# Patient Record
Sex: MALE | Race: WHITE | NOT HISPANIC OR LATINO | Employment: FULL TIME | URBAN - NONMETROPOLITAN AREA
[De-identification: names, ages, dates, MRNs, and addresses within clinical notes are randomized per-mention and may not be internally consistent; named-entity substitution may affect disease eponyms.]

---

## 2020-06-27 ENCOUNTER — HOSPITAL ENCOUNTER (INPATIENT)
Facility: HOSPITAL | Age: 30
LOS: 6 days | Discharge: HOME | End: 2020-07-03
Attending: FAMILY MEDICINE | Admitting: SURGERY

## 2020-06-27 ENCOUNTER — APPOINTMENT (OUTPATIENT)
Dept: CT IMAGING | Facility: HOSPITAL | Age: 30
End: 2020-06-27

## 2020-06-27 DIAGNOSIS — M79.604 PAIN OF RIGHT LOWER EXTREMITY DUE TO ISCHEMIA: ICD-10-CM

## 2020-06-27 DIAGNOSIS — I99.8 PAIN OF RIGHT LOWER EXTREMITY DUE TO ISCHEMIA: ICD-10-CM

## 2020-06-27 DIAGNOSIS — I70.209 OCCLUSION OF ARTERY OF LOWER EXTREMITY (HCC): Primary | ICD-10-CM

## 2020-06-27 LAB
ALBUMIN SERPL-MCNC: 4.1 G/DL (ref 3.5–5.2)
ALBUMIN/GLOB SERPL: 1.5 G/DL
ALP SERPL-CCNC: 63 U/L (ref 39–117)
ALT SERPL W P-5'-P-CCNC: 70 U/L (ref 1–41)
ANION GAP SERPL CALCULATED.3IONS-SCNC: 12 MMOL/L (ref 5–15)
APTT PPP: 30.3 SECONDS (ref 24.1–35)
APTT PPP: 38.7 SECONDS (ref 24.1–35)
AST SERPL-CCNC: 48 U/L (ref 1–40)
BASOPHILS # BLD AUTO: 0.04 10*3/MM3 (ref 0–0.2)
BASOPHILS NFR BLD AUTO: 0.7 % (ref 0–1.5)
BILIRUB SERPL-MCNC: 0.7 MG/DL (ref 0.2–1.2)
BUN BLD-MCNC: 15 MG/DL (ref 6–20)
BUN/CREAT SERPL: 13.9 (ref 7–25)
CALCIUM SPEC-SCNC: 9.6 MG/DL (ref 8.6–10.5)
CHLORIDE SERPL-SCNC: 100 MMOL/L (ref 98–107)
CO2 SERPL-SCNC: 27 MMOL/L (ref 22–29)
CREAT BLD-MCNC: 1.08 MG/DL (ref 0.76–1.27)
DEPRECATED RDW RBC AUTO: 41.4 FL (ref 37–54)
EOSINOPHIL # BLD AUTO: 0.11 10*3/MM3 (ref 0–0.4)
EOSINOPHIL NFR BLD AUTO: 1.8 % (ref 0.3–6.2)
ERYTHROCYTE [DISTWIDTH] IN BLOOD BY AUTOMATED COUNT: 12.9 % (ref 12.3–15.4)
GFR SERPL CREATININE-BSD FRML MDRD: 79 ML/MIN/1.73
GLOBULIN UR ELPH-MCNC: 2.8 GM/DL
GLUCOSE BLD-MCNC: 86 MG/DL (ref 65–99)
HCT VFR BLD AUTO: 41.4 % (ref 37.5–51)
HGB BLD-MCNC: 14.9 G/DL (ref 13–17.7)
IMM GRANULOCYTES # BLD AUTO: 0.02 10*3/MM3 (ref 0–0.05)
IMM GRANULOCYTES NFR BLD AUTO: 0.3 % (ref 0–0.5)
INR PPP: 1.06 (ref 0.91–1.09)
LYMPHOCYTES # BLD AUTO: 0.7 10*3/MM3 (ref 0.7–3.1)
LYMPHOCYTES NFR BLD AUTO: 11.6 % (ref 19.6–45.3)
MCH RBC QN AUTO: 31.6 PG (ref 26.6–33)
MCHC RBC AUTO-ENTMCNC: 36 G/DL (ref 31.5–35.7)
MCV RBC AUTO: 87.7 FL (ref 79–97)
MONOCYTES # BLD AUTO: 0.6 10*3/MM3 (ref 0.1–0.9)
MONOCYTES NFR BLD AUTO: 9.9 % (ref 5–12)
NEUTROPHILS # BLD AUTO: 4.57 10*3/MM3 (ref 1.7–7)
NEUTROPHILS NFR BLD AUTO: 75.7 % (ref 42.7–76)
NRBC BLD AUTO-RTO: 0 /100 WBC (ref 0–0.2)
PLATELET # BLD AUTO: 250 10*3/MM3 (ref 140–450)
PMV BLD AUTO: 9.3 FL (ref 6–12)
POTASSIUM BLD-SCNC: 4.5 MMOL/L (ref 3.5–5.2)
PROT SERPL-MCNC: 6.9 G/DL (ref 6–8.5)
PROTHROMBIN TIME: 13.4 SECONDS (ref 11.9–14.6)
RBC # BLD AUTO: 4.72 10*6/MM3 (ref 4.14–5.8)
SARS-COV-2 RNA RESP QL NAA+PROBE: NOT DETECTED
SODIUM BLD-SCNC: 139 MMOL/L (ref 136–145)
WBC NRBC COR # BLD: 6.04 10*3/MM3 (ref 3.4–10.8)

## 2020-06-27 PROCEDURE — 0 IOPAMIDOL PER 1 ML: Performed by: FAMILY MEDICINE

## 2020-06-27 PROCEDURE — 99285 EMERGENCY DEPT VISIT HI MDM: CPT

## 2020-06-27 PROCEDURE — 85025 COMPLETE CBC W/AUTO DIFF WBC: CPT | Performed by: FAMILY MEDICINE

## 2020-06-27 PROCEDURE — 80053 COMPREHEN METABOLIC PANEL: CPT | Performed by: FAMILY MEDICINE

## 2020-06-27 PROCEDURE — 85610 PROTHROMBIN TIME: CPT | Performed by: FAMILY MEDICINE

## 2020-06-27 PROCEDURE — 87635 SARS-COV-2 COVID-19 AMP PRB: CPT | Performed by: FAMILY MEDICINE

## 2020-06-27 PROCEDURE — 99284 EMERGENCY DEPT VISIT MOD MDM: CPT

## 2020-06-27 PROCEDURE — 25010000002 HYDROMORPHONE PER 4 MG: Performed by: SURGERY

## 2020-06-27 PROCEDURE — 73706 CT ANGIO LWR EXTR W/O&W/DYE: CPT

## 2020-06-27 PROCEDURE — 25010000002 HEPARIN (PORCINE) PER 1000 UNITS: Performed by: FAMILY MEDICINE

## 2020-06-27 PROCEDURE — 99223 1ST HOSP IP/OBS HIGH 75: CPT | Performed by: SURGERY

## 2020-06-27 PROCEDURE — 25010000002 MORPHINE PER 10 MG: Performed by: FAMILY MEDICINE

## 2020-06-27 PROCEDURE — 85730 THROMBOPLASTIN TIME PARTIAL: CPT | Performed by: SURGERY

## 2020-06-27 PROCEDURE — 25010000002 HEPARIN (PORCINE) PER 1000 UNITS: Performed by: SURGERY

## 2020-06-27 PROCEDURE — 25010000002 ONDANSETRON PER 1 MG: Performed by: FAMILY MEDICINE

## 2020-06-27 PROCEDURE — 85730 THROMBOPLASTIN TIME PARTIAL: CPT | Performed by: FAMILY MEDICINE

## 2020-06-27 PROCEDURE — C9803 HOPD COVID-19 SPEC COLLECT: HCPCS | Performed by: FAMILY MEDICINE

## 2020-06-27 RX ORDER — ONDANSETRON 2 MG/ML
4 INJECTION INTRAMUSCULAR; INTRAVENOUS ONCE
Status: COMPLETED | OUTPATIENT
Start: 2020-06-27 | End: 2020-06-27

## 2020-06-27 RX ORDER — HEPARIN SODIUM 5000 [USP'U]/ML
5000 INJECTION, SOLUTION INTRAVENOUS; SUBCUTANEOUS ONCE
Status: COMPLETED | OUTPATIENT
Start: 2020-06-27 | End: 2020-06-27

## 2020-06-27 RX ORDER — SODIUM CHLORIDE 0.9 % (FLUSH) 0.9 %
10 SYRINGE (ML) INJECTION EVERY 12 HOURS SCHEDULED
Status: DISCONTINUED | OUTPATIENT
Start: 2020-06-27 | End: 2020-07-03 | Stop reason: HOSPADM

## 2020-06-27 RX ORDER — NALOXONE HCL 0.4 MG/ML
0.4 VIAL (ML) INJECTION
Status: DISCONTINUED | OUTPATIENT
Start: 2020-06-27 | End: 2020-07-03 | Stop reason: HOSPADM

## 2020-06-27 RX ORDER — HEPARIN SODIUM 1000 [USP'U]/ML
10000 INJECTION, SOLUTION INTRAVENOUS; SUBCUTANEOUS AS NEEDED
Status: DISCONTINUED | OUTPATIENT
Start: 2020-06-27 | End: 2020-07-01

## 2020-06-27 RX ORDER — SODIUM CHLORIDE 0.9 % (FLUSH) 0.9 %
10 SYRINGE (ML) INJECTION AS NEEDED
Status: DISCONTINUED | OUTPATIENT
Start: 2020-06-27 | End: 2020-07-03 | Stop reason: HOSPADM

## 2020-06-27 RX ORDER — ONDANSETRON 2 MG/ML
4 INJECTION INTRAMUSCULAR; INTRAVENOUS EVERY 6 HOURS PRN
Status: DISCONTINUED | OUTPATIENT
Start: 2020-06-27 | End: 2020-07-03 | Stop reason: HOSPADM

## 2020-06-27 RX ORDER — ONDANSETRON 4 MG/1
4 TABLET, FILM COATED ORAL EVERY 6 HOURS PRN
Status: DISCONTINUED | OUTPATIENT
Start: 2020-06-27 | End: 2020-07-03 | Stop reason: HOSPADM

## 2020-06-27 RX ORDER — HYDROMORPHONE HYDROCHLORIDE 1 MG/ML
0.5 INJECTION, SOLUTION INTRAMUSCULAR; INTRAVENOUS; SUBCUTANEOUS
Status: DISCONTINUED | OUTPATIENT
Start: 2020-06-27 | End: 2020-07-03 | Stop reason: HOSPADM

## 2020-06-27 RX ORDER — SODIUM CHLORIDE 9 MG/ML
125 INJECTION, SOLUTION INTRAVENOUS CONTINUOUS
Status: DISCONTINUED | OUTPATIENT
Start: 2020-06-27 | End: 2020-07-01

## 2020-06-27 RX ORDER — HEPARIN SODIUM 10000 [USP'U]/100ML
1500 INJECTION, SOLUTION INTRAVENOUS
Status: DISCONTINUED | OUTPATIENT
Start: 2020-06-27 | End: 2020-07-01

## 2020-06-27 RX ORDER — HEPARIN SODIUM 1000 [USP'U]/ML
5000 INJECTION, SOLUTION INTRAVENOUS; SUBCUTANEOUS AS NEEDED
Status: DISCONTINUED | OUTPATIENT
Start: 2020-06-27 | End: 2020-07-01

## 2020-06-27 RX ORDER — HYDROCODONE BITARTRATE AND ACETAMINOPHEN 10; 325 MG/1; MG/1
1 TABLET ORAL EVERY 4 HOURS PRN
Status: DISCONTINUED | OUTPATIENT
Start: 2020-06-27 | End: 2020-07-02

## 2020-06-27 RX ADMIN — MORPHINE SULFATE 4 MG: 4 INJECTION, SOLUTION INTRAMUSCULAR; INTRAVENOUS at 16:22

## 2020-06-27 RX ADMIN — HYDROMORPHONE HYDROCHLORIDE 0.5 MG: 1 INJECTION, SOLUTION INTRAMUSCULAR; INTRAVENOUS; SUBCUTANEOUS at 21:38

## 2020-06-27 RX ADMIN — MORPHINE SULFATE 4 MG: 4 INJECTION, SOLUTION INTRAMUSCULAR; INTRAVENOUS at 13:27

## 2020-06-27 RX ADMIN — HYDROCODONE BITARTRATE AND ACETAMINOPHEN 1 TABLET: 10; 325 TABLET ORAL at 23:53

## 2020-06-27 RX ADMIN — HEPARIN SODIUM 1700 UNITS/HR: 10000 INJECTION, SOLUTION INTRAVENOUS at 22:15

## 2020-06-27 RX ADMIN — HYDROCODONE BITARTRATE AND ACETAMINOPHEN 1 TABLET: 10; 325 TABLET ORAL at 19:48

## 2020-06-27 RX ADMIN — HYDROMORPHONE HYDROCHLORIDE 0.5 MG: 1 INJECTION, SOLUTION INTRAMUSCULAR; INTRAVENOUS; SUBCUTANEOUS at 18:46

## 2020-06-27 RX ADMIN — HEPARIN SODIUM 10000 UNITS: 1000 INJECTION, SOLUTION INTRAVENOUS; SUBCUTANEOUS at 23:37

## 2020-06-27 RX ADMIN — HEPARIN SODIUM 5000 UNITS: 5000 INJECTION, SOLUTION INTRAVENOUS; SUBCUTANEOUS at 18:04

## 2020-06-27 RX ADMIN — IOPAMIDOL 100 ML: 755 INJECTION, SOLUTION INTRAVENOUS at 15:13

## 2020-06-27 RX ADMIN — SODIUM CHLORIDE 125 ML/HR: 9 INJECTION, SOLUTION INTRAVENOUS at 13:31

## 2020-06-27 RX ADMIN — ONDANSETRON HYDROCHLORIDE 4 MG: 2 SOLUTION INTRAMUSCULAR; INTRAVENOUS at 13:27

## 2020-06-27 RX ADMIN — HEPARIN SODIUM 1500 UNITS/HR: 10000 INJECTION, SOLUTION INTRAVENOUS at 19:40

## 2020-06-28 ENCOUNTER — APPOINTMENT (OUTPATIENT)
Dept: CT IMAGING | Facility: HOSPITAL | Age: 30
End: 2020-06-28

## 2020-06-28 ENCOUNTER — APPOINTMENT (OUTPATIENT)
Dept: CARDIOLOGY | Facility: HOSPITAL | Age: 30
End: 2020-06-28

## 2020-06-28 LAB
ABO GROUP BLD: NORMAL
ANION GAP SERPL CALCULATED.3IONS-SCNC: 11 MMOL/L (ref 5–15)
APTT PPP: 152.2 SECONDS (ref 24.1–35)
APTT PPP: 54.3 SECONDS (ref 24.1–35)
APTT PPP: 80.8 SECONDS (ref 24.1–35)
BASOPHILS # BLD AUTO: 0.04 10*3/MM3 (ref 0–0.2)
BASOPHILS NFR BLD AUTO: 0.8 % (ref 0–1.5)
BH CV ECHO MEAS - AO MAX PG (FULL): 3.5 MMHG
BH CV ECHO MEAS - AO MAX PG: 5.6 MMHG
BH CV ECHO MEAS - AO MEAN PG (FULL): 3 MMHG
BH CV ECHO MEAS - AO MEAN PG: 4 MMHG
BH CV ECHO MEAS - AO ROOT AREA (BSA CORRECTED): 1.5
BH CV ECHO MEAS - AO ROOT AREA: 10.8 CM^2
BH CV ECHO MEAS - AO ROOT DIAM: 3.7 CM
BH CV ECHO MEAS - AO V2 MAX: 118 CM/SEC
BH CV ECHO MEAS - AO V2 MEAN: 93.2 CM/SEC
BH CV ECHO MEAS - AO V2 VTI: 26.4 CM
BH CV ECHO MEAS - AVA(I,A): 2.5 CM^2
BH CV ECHO MEAS - AVA(I,D): 2.5 CM^2
BH CV ECHO MEAS - AVA(V,A): 2.3 CM^2
BH CV ECHO MEAS - AVA(V,D): 2.3 CM^2
BH CV ECHO MEAS - BSA(HAYCOCK): 2.7 M^2
BH CV ECHO MEAS - BSA: 2.5 M^2
BH CV ECHO MEAS - BZI_BMI: 38.8 KILOGRAMS/M^2
BH CV ECHO MEAS - BZI_METRIC_HEIGHT: 185.4 CM
BH CV ECHO MEAS - BZI_METRIC_WEIGHT: 133.4 KG
BH CV ECHO MEAS - EDV(CUBED): 125.8 ML
BH CV ECHO MEAS - EDV(MOD-SP4): 222 ML
BH CV ECHO MEAS - EDV(TEICH): 118.8 ML
BH CV ECHO MEAS - EF(CUBED): 64.7 %
BH CV ECHO MEAS - EF(MOD-SP4): 60.9 %
BH CV ECHO MEAS - EF(TEICH): 56 %
BH CV ECHO MEAS - ESV(CUBED): 44.4 ML
BH CV ECHO MEAS - ESV(MOD-SP4): 86.7 ML
BH CV ECHO MEAS - ESV(TEICH): 52.3 ML
BH CV ECHO MEAS - FS: 29.3 %
BH CV ECHO MEAS - IVS/LVPW: 0.85
BH CV ECHO MEAS - IVSD: 1.2 CM
BH CV ECHO MEAS - LA DIMENSION: 5.1 CM
BH CV ECHO MEAS - LA/AO: 1.4
BH CV ECHO MEAS - LAT PEAK E' VEL: 8.1 CM/SEC
BH CV ECHO MEAS - LV DIASTOLIC VOL/BSA (35-75): 87.6 ML/M^2
BH CV ECHO MEAS - LV MASS(C)D: 255.5 GRAMS
BH CV ECHO MEAS - LV MASS(C)DI: 100.8 GRAMS/M^2
BH CV ECHO MEAS - LV MAX PG: 2.1 MMHG
BH CV ECHO MEAS - LV MEAN PG: 1 MMHG
BH CV ECHO MEAS - LV SYSTOLIC VOL/BSA (12-30): 34.2 ML/M^2
BH CV ECHO MEAS - LV V1 MAX: 72.5 CM/SEC
BH CV ECHO MEAS - LV V1 MEAN: 56.9 CM/SEC
BH CV ECHO MEAS - LV V1 VTI: 17.4 CM
BH CV ECHO MEAS - LVIDD: 5 CM
BH CV ECHO MEAS - LVIDS: 3.5 CM
BH CV ECHO MEAS - LVLD AP4: 10.1 CM
BH CV ECHO MEAS - LVLS AP4: 7.8 CM
BH CV ECHO MEAS - LVOT AREA (M): 3.8 CM^2
BH CV ECHO MEAS - LVOT AREA: 3.8 CM^2
BH CV ECHO MEAS - LVOT DIAM: 2.2 CM
BH CV ECHO MEAS - LVPWD: 1.4 CM
BH CV ECHO MEAS - MED PEAK E' VEL: 6.4 CM/SEC
BH CV ECHO MEAS - MR MAX PG: 101.6 MMHG
BH CV ECHO MEAS - MR MAX VEL: 504 CM/SEC
BH CV ECHO MEAS - MR MEAN PG: 72 MMHG
BH CV ECHO MEAS - MR MEAN VEL: 404 CM/SEC
BH CV ECHO MEAS - MR VTI: 179 CM
BH CV ECHO MEAS - MV A MAX VEL: 43.7 CM/SEC
BH CV ECHO MEAS - MV DEC SLOPE: 158 CM/SEC^2
BH CV ECHO MEAS - MV DEC TIME: 0.35 SEC
BH CV ECHO MEAS - MV E MAX VEL: 55.6 CM/SEC
BH CV ECHO MEAS - MV E/A: 1.3
BH CV ECHO MEAS - RAP SYSTOLE: 10 MMHG
BH CV ECHO MEAS - RVSP: 35.2 MMHG
BH CV ECHO MEAS - SI(AO): 112 ML/M^2
BH CV ECHO MEAS - SI(CUBED): 32.1 ML/M^2
BH CV ECHO MEAS - SI(LVOT): 26.1 ML/M^2
BH CV ECHO MEAS - SI(MOD-SP4): 53.4 ML/M^2
BH CV ECHO MEAS - SI(TEICH): 26.2 ML/M^2
BH CV ECHO MEAS - SV(AO): 283.9 ML
BH CV ECHO MEAS - SV(CUBED): 81.4 ML
BH CV ECHO MEAS - SV(LVOT): 66.1 ML
BH CV ECHO MEAS - SV(MOD-SP4): 135.3 ML
BH CV ECHO MEAS - SV(TEICH): 66.5 ML
BH CV ECHO MEAS - TR MAX VEL: 251 CM/SEC
BH CV ECHO MEASUREMENTS AVERAGE E/E' RATIO: 7.67
BLD GP AB SCN SERPL QL: NEGATIVE
BUN BLD-MCNC: 12 MG/DL (ref 6–20)
BUN/CREAT SERPL: 12.9 (ref 7–25)
CALCIUM SPEC-SCNC: 9 MG/DL (ref 8.6–10.5)
CHLORIDE SERPL-SCNC: 103 MMOL/L (ref 98–107)
CO2 SERPL-SCNC: 25 MMOL/L (ref 22–29)
CREAT BLD-MCNC: 0.93 MG/DL (ref 0.76–1.27)
DEPRECATED RDW RBC AUTO: 40.4 FL (ref 37–54)
EOSINOPHIL # BLD AUTO: 0.11 10*3/MM3 (ref 0–0.4)
EOSINOPHIL NFR BLD AUTO: 2.2 % (ref 0.3–6.2)
ERYTHROCYTE [DISTWIDTH] IN BLOOD BY AUTOMATED COUNT: 12.8 % (ref 12.3–15.4)
GFR SERPL CREATININE-BSD FRML MDRD: 94 ML/MIN/1.73
GLUCOSE BLD-MCNC: 104 MG/DL (ref 65–99)
HCT VFR BLD AUTO: 41 % (ref 37.5–51)
HGB BLD-MCNC: 14.9 G/DL (ref 13–17.7)
IMM GRANULOCYTES # BLD AUTO: 0.02 10*3/MM3 (ref 0–0.05)
IMM GRANULOCYTES NFR BLD AUTO: 0.4 % (ref 0–0.5)
LEFT ATRIUM VOLUME INDEX: 22 ML/M2
LEFT ATRIUM VOLUME: 55.6 CM3
LYMPHOCYTES # BLD AUTO: 0.75 10*3/MM3 (ref 0.7–3.1)
LYMPHOCYTES NFR BLD AUTO: 14.8 % (ref 19.6–45.3)
MAXIMAL PREDICTED HEART RATE: 188 BPM
MCH RBC QN AUTO: 31.8 PG (ref 26.6–33)
MCHC RBC AUTO-ENTMCNC: 36.3 G/DL (ref 31.5–35.7)
MCV RBC AUTO: 87.4 FL (ref 79–97)
MONOCYTES # BLD AUTO: 0.54 10*3/MM3 (ref 0.1–0.9)
MONOCYTES NFR BLD AUTO: 10.7 % (ref 5–12)
NEUTROPHILS # BLD AUTO: 3.6 10*3/MM3 (ref 1.7–7)
NEUTROPHILS NFR BLD AUTO: 71.1 % (ref 42.7–76)
NRBC BLD AUTO-RTO: 0 /100 WBC (ref 0–0.2)
PLATELET # BLD AUTO: 233 10*3/MM3 (ref 140–450)
PMV BLD AUTO: 9.5 FL (ref 6–12)
POTASSIUM BLD-SCNC: 4.1 MMOL/L (ref 3.5–5.2)
RBC # BLD AUTO: 4.69 10*6/MM3 (ref 4.14–5.8)
RH BLD: POSITIVE
SODIUM BLD-SCNC: 139 MMOL/L (ref 136–145)
STRESS TARGET HR: 160 BPM
T&S EXPIRATION DATE: NORMAL
WBC NRBC COR # BLD: 5.06 10*3/MM3 (ref 3.4–10.8)

## 2020-06-28 PROCEDURE — 86850 RBC ANTIBODY SCREEN: CPT | Performed by: SURGERY

## 2020-06-28 PROCEDURE — 36415 COLL VENOUS BLD VENIPUNCTURE: CPT | Performed by: SURGERY

## 2020-06-28 PROCEDURE — 85025 COMPLETE CBC W/AUTO DIFF WBC: CPT | Performed by: SURGERY

## 2020-06-28 PROCEDURE — 86901 BLOOD TYPING SEROLOGIC RH(D): CPT | Performed by: SURGERY

## 2020-06-28 PROCEDURE — 99024 POSTOP FOLLOW-UP VISIT: CPT | Performed by: SURGERY

## 2020-06-28 PROCEDURE — 80048 BASIC METABOLIC PNL TOTAL CA: CPT | Performed by: SURGERY

## 2020-06-28 PROCEDURE — 93306 TTE W/DOPPLER COMPLETE: CPT

## 2020-06-28 PROCEDURE — 25010000002 HEPARIN (PORCINE) PER 1000 UNITS: Performed by: SURGERY

## 2020-06-28 PROCEDURE — 71275 CT ANGIOGRAPHY CHEST: CPT

## 2020-06-28 PROCEDURE — 86900 BLOOD TYPING SEROLOGIC ABO: CPT | Performed by: SURGERY

## 2020-06-28 PROCEDURE — 93306 TTE W/DOPPLER COMPLETE: CPT | Performed by: INTERNAL MEDICINE

## 2020-06-28 PROCEDURE — 85730 THROMBOPLASTIN TIME PARTIAL: CPT | Performed by: SURGERY

## 2020-06-28 PROCEDURE — 25010000002 HYDROMORPHONE PER 4 MG: Performed by: SURGERY

## 2020-06-28 PROCEDURE — 0 IOPAMIDOL PER 1 ML: Performed by: SURGERY

## 2020-06-28 PROCEDURE — 25010000002 PERFLUTREN 6.52 MG/ML SUSPENSION: Performed by: SURGERY

## 2020-06-28 RX ADMIN — PERFLUTREN 8.48 MG: 6.52 INJECTION, SUSPENSION INTRAVENOUS at 14:05

## 2020-06-28 RX ADMIN — SODIUM CHLORIDE 125 ML/HR: 9 INJECTION, SOLUTION INTRAVENOUS at 19:58

## 2020-06-28 RX ADMIN — HYDROCODONE BITARTRATE AND ACETAMINOPHEN 1 TABLET: 10; 325 TABLET ORAL at 09:34

## 2020-06-28 RX ADMIN — SODIUM CHLORIDE 125 ML/HR: 9 INJECTION, SOLUTION INTRAVENOUS at 13:19

## 2020-06-28 RX ADMIN — HYDROCODONE BITARTRATE AND ACETAMINOPHEN 1 TABLET: 10; 325 TABLET ORAL at 23:12

## 2020-06-28 RX ADMIN — HYDROMORPHONE HYDROCHLORIDE 0.5 MG: 1 INJECTION, SOLUTION INTRAMUSCULAR; INTRAVENOUS; SUBCUTANEOUS at 10:28

## 2020-06-28 RX ADMIN — HYDROMORPHONE HYDROCHLORIDE 0.5 MG: 1 INJECTION, SOLUTION INTRAMUSCULAR; INTRAVENOUS; SUBCUTANEOUS at 15:40

## 2020-06-28 RX ADMIN — HYDROMORPHONE HYDROCHLORIDE 0.5 MG: 1 INJECTION, SOLUTION INTRAMUSCULAR; INTRAVENOUS; SUBCUTANEOUS at 04:07

## 2020-06-28 RX ADMIN — HEPARIN SODIUM 10000 UNITS: 1000 INJECTION, SOLUTION INTRAVENOUS; SUBCUTANEOUS at 14:42

## 2020-06-28 RX ADMIN — IOPAMIDOL 84 ML: 755 INJECTION, SOLUTION INTRAVENOUS at 08:25

## 2020-06-28 RX ADMIN — HYDROCODONE BITARTRATE AND ACETAMINOPHEN 1 TABLET: 10; 325 TABLET ORAL at 14:41

## 2020-06-28 RX ADMIN — HYDROMORPHONE HYDROCHLORIDE 0.5 MG: 1 INJECTION, SOLUTION INTRAMUSCULAR; INTRAVENOUS; SUBCUTANEOUS at 22:21

## 2020-06-28 RX ADMIN — HYDROMORPHONE HYDROCHLORIDE 0.5 MG: 1 INJECTION, SOLUTION INTRAMUSCULAR; INTRAVENOUS; SUBCUTANEOUS at 07:43

## 2020-06-28 RX ADMIN — HYDROCODONE BITARTRATE AND ACETAMINOPHEN 1 TABLET: 10; 325 TABLET ORAL at 05:06

## 2020-06-28 RX ADMIN — HEPARIN SODIUM 1700 UNITS/HR: 10000 INJECTION, SOLUTION INTRAVENOUS at 06:30

## 2020-06-28 RX ADMIN — HYDROMORPHONE HYDROCHLORIDE 0.5 MG: 1 INJECTION, SOLUTION INTRAMUSCULAR; INTRAVENOUS; SUBCUTANEOUS at 12:57

## 2020-06-28 RX ADMIN — HEPARIN SODIUM 1700 UNITS/HR: 10000 INJECTION, SOLUTION INTRAVENOUS at 10:55

## 2020-06-28 RX ADMIN — HYDROMORPHONE HYDROCHLORIDE 0.5 MG: 1 INJECTION, SOLUTION INTRAMUSCULAR; INTRAVENOUS; SUBCUTANEOUS at 19:54

## 2020-06-28 RX ADMIN — HYDROMORPHONE HYDROCHLORIDE 0.5 MG: 1 INJECTION, SOLUTION INTRAMUSCULAR; INTRAVENOUS; SUBCUTANEOUS at 17:43

## 2020-06-28 RX ADMIN — HYDROMORPHONE HYDROCHLORIDE 0.5 MG: 1 INJECTION, SOLUTION INTRAMUSCULAR; INTRAVENOUS; SUBCUTANEOUS at 01:29

## 2020-06-28 RX ADMIN — HYDROCODONE BITARTRATE AND ACETAMINOPHEN 1 TABLET: 10; 325 TABLET ORAL at 18:43

## 2020-06-29 ENCOUNTER — ANESTHESIA EVENT (OUTPATIENT)
Dept: PERIOP | Facility: HOSPITAL | Age: 30
End: 2020-06-29

## 2020-06-29 ENCOUNTER — ANESTHESIA (OUTPATIENT)
Dept: PERIOP | Facility: HOSPITAL | Age: 30
End: 2020-06-29

## 2020-06-29 ENCOUNTER — APPOINTMENT (OUTPATIENT)
Dept: INTERVENTIONAL RADIOLOGY/VASCULAR | Facility: HOSPITAL | Age: 30
End: 2020-06-29

## 2020-06-29 LAB
ANION GAP SERPL CALCULATED.3IONS-SCNC: 11 MMOL/L (ref 5–15)
ANION GAP SERPL CALCULATED.3IONS-SCNC: 12 MMOL/L (ref 5–15)
APTT PPP: 131.7 SECONDS (ref 24.1–35)
APTT PPP: 42.8 SECONDS (ref 24.1–35)
APTT PPP: 56.4 SECONDS (ref 24.1–35)
APTT PPP: 59.2 SECONDS (ref 24.1–35)
APTT PPP: 63.6 SECONDS (ref 24.1–35)
BASOPHILS # BLD AUTO: 0.02 10*3/MM3 (ref 0–0.2)
BASOPHILS # BLD AUTO: 0.03 10*3/MM3 (ref 0–0.2)
BASOPHILS NFR BLD AUTO: 0.4 % (ref 0–1.5)
BASOPHILS NFR BLD AUTO: 0.6 % (ref 0–1.5)
BUN BLD-MCNC: 11 MG/DL (ref 6–20)
BUN SERPL-MCNC: 10 MG/DL (ref 6–20)
BUN/CREAT SERPL: 11.8 (ref 7–25)
BUN/CREAT SERPL: 8.8 (ref 7–25)
CALCIUM SPEC-SCNC: 8.9 MG/DL (ref 8.6–10.5)
CALCIUM SPEC-SCNC: 9 MG/DL (ref 8.6–10.5)
CHLORIDE SERPL-SCNC: 103 MMOL/L (ref 98–107)
CHLORIDE SERPL-SCNC: 104 MMOL/L (ref 98–107)
CO2 SERPL-SCNC: 24 MMOL/L (ref 22–29)
CO2 SERPL-SCNC: 25 MMOL/L (ref 22–29)
CREAT BLD-MCNC: 0.93 MG/DL (ref 0.76–1.27)
CREAT SERPL-MCNC: 1.13 MG/DL (ref 0.76–1.27)
DEPRECATED RDW RBC AUTO: 40.6 FL (ref 37–54)
DEPRECATED RDW RBC AUTO: 41.1 FL (ref 37–54)
EOSINOPHIL # BLD AUTO: 0.05 10*3/MM3 (ref 0–0.4)
EOSINOPHIL # BLD AUTO: 0.1 10*3/MM3 (ref 0–0.4)
EOSINOPHIL NFR BLD AUTO: 1 % (ref 0.3–6.2)
EOSINOPHIL NFR BLD AUTO: 2 % (ref 0.3–6.2)
ERYTHROCYTE [DISTWIDTH] IN BLOOD BY AUTOMATED COUNT: 12.8 % (ref 12.3–15.4)
ERYTHROCYTE [DISTWIDTH] IN BLOOD BY AUTOMATED COUNT: 12.8 % (ref 12.3–15.4)
GFR SERPL CREATININE-BSD FRML MDRD: 75 ML/MIN/1.73
GFR SERPL CREATININE-BSD FRML MDRD: 94 ML/MIN/1.73
GLUCOSE BLD-MCNC: 96 MG/DL (ref 65–99)
GLUCOSE SERPL-MCNC: 110 MG/DL (ref 65–99)
HCT VFR BLD AUTO: 39.1 % (ref 37.5–51)
HCT VFR BLD AUTO: 39.5 % (ref 37.5–51)
HGB BLD-MCNC: 13.8 G/DL (ref 13–17.7)
HGB BLD-MCNC: 14.1 G/DL (ref 13–17.7)
IMM GRANULOCYTES # BLD AUTO: 0.02 10*3/MM3 (ref 0–0.05)
IMM GRANULOCYTES # BLD AUTO: 0.03 10*3/MM3 (ref 0–0.05)
IMM GRANULOCYTES NFR BLD AUTO: 0.4 % (ref 0–0.5)
IMM GRANULOCYTES NFR BLD AUTO: 0.6 % (ref 0–0.5)
LYMPHOCYTES # BLD AUTO: 0.55 10*3/MM3 (ref 0.7–3.1)
LYMPHOCYTES # BLD AUTO: 0.76 10*3/MM3 (ref 0.7–3.1)
LYMPHOCYTES NFR BLD AUTO: 11.4 % (ref 19.6–45.3)
LYMPHOCYTES NFR BLD AUTO: 14.9 % (ref 19.6–45.3)
MCH RBC QN AUTO: 30.8 PG (ref 26.6–33)
MCH RBC QN AUTO: 31.3 PG (ref 26.6–33)
MCHC RBC AUTO-ENTMCNC: 35.3 G/DL (ref 31.5–35.7)
MCHC RBC AUTO-ENTMCNC: 35.7 G/DL (ref 31.5–35.7)
MCV RBC AUTO: 87.3 FL (ref 79–97)
MCV RBC AUTO: 87.8 FL (ref 79–97)
MONOCYTES # BLD AUTO: 0.36 10*3/MM3 (ref 0.1–0.9)
MONOCYTES # BLD AUTO: 0.51 10*3/MM3 (ref 0.1–0.9)
MONOCYTES NFR BLD AUTO: 10 % (ref 5–12)
MONOCYTES NFR BLD AUTO: 7.5 % (ref 5–12)
NEUTROPHILS # BLD AUTO: 3.69 10*3/MM3 (ref 1.7–7)
NEUTROPHILS NFR BLD AUTO: 3.81 10*3/MM3 (ref 1.7–7)
NEUTROPHILS NFR BLD AUTO: 72.3 % (ref 42.7–76)
NEUTROPHILS NFR BLD AUTO: 78.9 % (ref 42.7–76)
NRBC BLD AUTO-RTO: 0 /100 WBC (ref 0–0.2)
NRBC BLD AUTO-RTO: 0 /100 WBC (ref 0–0.2)
PLATELET # BLD AUTO: 229 10*3/MM3 (ref 140–450)
PLATELET # BLD AUTO: 242 10*3/MM3 (ref 140–450)
PMV BLD AUTO: 9.2 FL (ref 6–12)
PMV BLD AUTO: 9.3 FL (ref 6–12)
POTASSIUM BLD-SCNC: 4.2 MMOL/L (ref 3.5–5.2)
POTASSIUM SERPL-SCNC: 4.9 MMOL/L (ref 3.5–5.2)
RBC # BLD AUTO: 4.48 10*6/MM3 (ref 4.14–5.8)
RBC # BLD AUTO: 4.5 10*6/MM3 (ref 4.14–5.8)
SODIUM BLD-SCNC: 140 MMOL/L (ref 136–145)
SODIUM SERPL-SCNC: 139 MMOL/L (ref 136–145)
WBC # BLD AUTO: 4.83 10*3/MM3 (ref 3.4–10.8)
WBC NRBC COR # BLD: 5.1 10*3/MM3 (ref 3.4–10.8)

## 2020-06-29 PROCEDURE — C1874 STENT, COATED/COV W/DEL SYS: HCPCS | Performed by: SURGERY

## 2020-06-29 PROCEDURE — B41D1ZZ FLUOROSCOPY OF AORTA AND BILATERAL LOWER EXTREMITY ARTERIES USING LOW OSMOLAR CONTRAST: ICD-10-PCS | Performed by: SURGERY

## 2020-06-29 PROCEDURE — C1760 CLOSURE DEV, VASC: HCPCS | Performed by: SURGERY

## 2020-06-29 PROCEDURE — 25010000003 CEFAZOLIN PER 500 MG: Performed by: SURGERY

## 2020-06-29 PROCEDURE — 36415 COLL VENOUS BLD VENIPUNCTURE: CPT | Performed by: SURGERY

## 2020-06-29 PROCEDURE — C1725 CATH, TRANSLUMIN NON-LASER: HCPCS | Performed by: SURGERY

## 2020-06-29 PROCEDURE — 85025 COMPLETE CBC W/AUTO DIFF WBC: CPT | Performed by: SURGERY

## 2020-06-29 PROCEDURE — C1769 GUIDE WIRE: HCPCS | Performed by: SURGERY

## 2020-06-29 PROCEDURE — 047C3DZ DILATION OF RIGHT COMMON ILIAC ARTERY WITH INTRALUMINAL DEVICE, PERCUTANEOUS APPROACH: ICD-10-PCS | Performed by: SURGERY

## 2020-06-29 PROCEDURE — C1887 CATHETER, GUIDING: HCPCS | Performed by: SURGERY

## 2020-06-29 PROCEDURE — 047H3DZ DILATION OF RIGHT EXTERNAL ILIAC ARTERY WITH INTRALUMINAL DEVICE, PERCUTANEOUS APPROACH: ICD-10-PCS | Performed by: SURGERY

## 2020-06-29 PROCEDURE — 85730 THROMBOPLASTIN TIME PARTIAL: CPT | Performed by: SURGERY

## 2020-06-29 PROCEDURE — 25010000002 HYDROMORPHONE PER 4 MG: Performed by: SURGERY

## 2020-06-29 PROCEDURE — 76000 FLUOROSCOPY <1 HR PHYS/QHP: CPT

## 2020-06-29 PROCEDURE — 047P3ZZ DILATION OF RIGHT ANTERIOR TIBIAL ARTERY, PERCUTANEOUS APPROACH: ICD-10-PCS | Performed by: SURGERY

## 2020-06-29 PROCEDURE — 75625 CONTRAST EXAM ABDOMINL AORTA: CPT | Performed by: SURGERY

## 2020-06-29 PROCEDURE — 25010000002 HYDROMORPHONE PER 4 MG: Performed by: NURSE ANESTHETIST, CERTIFIED REGISTERED

## 2020-06-29 PROCEDURE — 25010000002 IOPAMIDOL 61 % SOLUTION: Performed by: SURGERY

## 2020-06-29 PROCEDURE — C1894 INTRO/SHEATH, NON-LASER: HCPCS | Performed by: SURGERY

## 2020-06-29 PROCEDURE — 35306 RECHANNELING OF ARTERY: CPT | Performed by: SURGERY

## 2020-06-29 PROCEDURE — 25010000002 PROPOFOL 10 MG/ML EMULSION: Performed by: NURSE ANESTHETIST, CERTIFIED REGISTERED

## 2020-06-29 PROCEDURE — 25010000002 FENTANYL CITRATE (PF) 100 MCG/2ML SOLUTION: Performed by: NURSE ANESTHETIST, CERTIFIED REGISTERED

## 2020-06-29 PROCEDURE — 04CR3ZZ EXTIRPATION OF MATTER FROM RIGHT POSTERIOR TIBIAL ARTERY, PERCUTANEOUS APPROACH: ICD-10-PCS | Performed by: SURGERY

## 2020-06-29 PROCEDURE — C1757 CATH, THROMBECTOMY/EMBOLECT: HCPCS | Performed by: SURGERY

## 2020-06-29 PROCEDURE — 04CP3ZZ EXTIRPATION OF MATTER FROM RIGHT ANTERIOR TIBIAL ARTERY, PERCUTANEOUS APPROACH: ICD-10-PCS | Performed by: SURGERY

## 2020-06-29 PROCEDURE — 75710 ARTERY X-RAYS ARM/LEG: CPT

## 2020-06-29 PROCEDURE — 35305 RECHANNELING OF ARTERY: CPT | Performed by: SURGERY

## 2020-06-29 PROCEDURE — 37221 PR REVSC OPN/PRQ ILIAC ART W/STNT PLMT & ANGIOPLSTY: CPT | Performed by: SURGERY

## 2020-06-29 PROCEDURE — 047R3ZZ DILATION OF RIGHT POSTERIOR TIBIAL ARTERY, PERCUTANEOUS APPROACH: ICD-10-PCS | Performed by: SURGERY

## 2020-06-29 PROCEDURE — 94799 UNLISTED PULMONARY SVC/PX: CPT

## 2020-06-29 PROCEDURE — 25010000002 FENTANYL CITRATE (PF) 100 MCG/2ML SOLUTION: Performed by: ANESTHESIOLOGY

## 2020-06-29 PROCEDURE — 80048 BASIC METABOLIC PNL TOTAL CA: CPT | Performed by: SURGERY

## 2020-06-29 PROCEDURE — 88304 TISSUE EXAM BY PATHOLOGIST: CPT | Performed by: SURGERY

## 2020-06-29 PROCEDURE — 25010000002 ONDANSETRON PER 1 MG: Performed by: ANESTHESIOLOGY

## 2020-06-29 PROCEDURE — 25010000002 MIDAZOLAM PER 1 MG: Performed by: NURSE ANESTHETIST, CERTIFIED REGISTERED

## 2020-06-29 PROCEDURE — 75710 ARTERY X-RAYS ARM/LEG: CPT | Performed by: SURGERY

## 2020-06-29 PROCEDURE — 047V3ZZ DILATION OF RIGHT FOOT ARTERY, PERCUTANEOUS APPROACH: ICD-10-PCS | Performed by: SURGERY

## 2020-06-29 PROCEDURE — 25010000003 LIDOCAINE 1 % SOLUTION: Performed by: SURGERY

## 2020-06-29 PROCEDURE — 25010000002 HEPARIN (PORCINE) PER 1000 UNITS: Performed by: NURSE ANESTHETIST, CERTIFIED REGISTERED

## 2020-06-29 PROCEDURE — 04CV3ZZ EXTIRPATION OF MATTER FROM RIGHT FOOT ARTERY, PERCUTANEOUS APPROACH: ICD-10-PCS | Performed by: SURGERY

## 2020-06-29 PROCEDURE — 25010000002 HEPARIN (PORCINE) PER 1000 UNITS: Performed by: SURGERY

## 2020-06-29 DEVICE — IMPLANTABLE DEVICE: Type: IMPLANTABLE DEVICE | Site: GROIN | Status: FUNCTIONAL

## 2020-06-29 RX ORDER — NIFEDIPINE 30 MG/1
30 TABLET, EXTENDED RELEASE ORAL DAILY
COMMUNITY

## 2020-06-29 RX ORDER — LABETALOL HYDROCHLORIDE 5 MG/ML
5 INJECTION, SOLUTION INTRAVENOUS
Status: DISCONTINUED | OUTPATIENT
Start: 2020-06-29 | End: 2020-06-29 | Stop reason: HOSPADM

## 2020-06-29 RX ORDER — DEXTROSE MONOHYDRATE 25 G/50ML
12.5 INJECTION, SOLUTION INTRAVENOUS AS NEEDED
Status: DISCONTINUED | OUTPATIENT
Start: 2020-06-29 | End: 2020-06-29 | Stop reason: HOSPADM

## 2020-06-29 RX ORDER — GABAPENTIN 300 MG/1
300 CAPSULE ORAL 3 TIMES DAILY
Status: DISCONTINUED | OUTPATIENT
Start: 2020-06-29 | End: 2020-06-29 | Stop reason: SDUPTHER

## 2020-06-29 RX ORDER — NALOXONE HCL 0.4 MG/ML
0.04 VIAL (ML) INJECTION AS NEEDED
Status: DISCONTINUED | OUTPATIENT
Start: 2020-06-29 | End: 2020-06-29 | Stop reason: HOSPADM

## 2020-06-29 RX ORDER — NITROGLYCERIN 0.1MG/HR
1 PATCH, TRANSDERMAL 24 HOURS TRANSDERMAL DAILY
Status: DISCONTINUED | OUTPATIENT
Start: 2020-06-29 | End: 2020-07-03 | Stop reason: HOSPADM

## 2020-06-29 RX ORDER — MORPHINE SULFATE 2 MG/ML
2 INJECTION, SOLUTION INTRAMUSCULAR; INTRAVENOUS
Status: DISCONTINUED | OUTPATIENT
Start: 2020-06-29 | End: 2020-06-29 | Stop reason: HOSPADM

## 2020-06-29 RX ORDER — HYDROMORPHONE HCL 110MG/55ML
PATIENT CONTROLLED ANALGESIA SYRINGE INTRAVENOUS AS NEEDED
Status: DISCONTINUED | OUTPATIENT
Start: 2020-06-29 | End: 2020-06-29 | Stop reason: SURG

## 2020-06-29 RX ORDER — MIDAZOLAM HYDROCHLORIDE 1 MG/ML
INJECTION INTRAMUSCULAR; INTRAVENOUS AS NEEDED
Status: DISCONTINUED | OUTPATIENT
Start: 2020-06-29 | End: 2020-06-29 | Stop reason: SURG

## 2020-06-29 RX ORDER — ASPIRIN 81 MG/1
81 TABLET ORAL DAILY
Status: DISCONTINUED | OUTPATIENT
Start: 2020-06-29 | End: 2020-07-03 | Stop reason: HOSPADM

## 2020-06-29 RX ORDER — FENTANYL CITRATE 50 UG/ML
25 INJECTION, SOLUTION INTRAMUSCULAR; INTRAVENOUS
Status: DISCONTINUED | OUTPATIENT
Start: 2020-06-29 | End: 2020-06-29 | Stop reason: HOSPADM

## 2020-06-29 RX ORDER — HYDROCODONE BITARTRATE AND ACETAMINOPHEN 7.5; 325 MG/1; MG/1
1 TABLET ORAL 3 TIMES DAILY
COMMUNITY
End: 2020-07-03 | Stop reason: HOSPADM

## 2020-06-29 RX ORDER — BUPIVACAINE HCL/0.9 % NACL/PF 0.1 %
2 PLASTIC BAG, INJECTION (ML) EPIDURAL ONCE
Status: COMPLETED | OUTPATIENT
Start: 2020-06-29 | End: 2020-06-29

## 2020-06-29 RX ORDER — HYDROMORPHONE HYDROCHLORIDE 1 MG/ML
0.5 INJECTION, SOLUTION INTRAMUSCULAR; INTRAVENOUS; SUBCUTANEOUS ONCE
Status: COMPLETED | OUTPATIENT
Start: 2020-06-29 | End: 2020-06-29

## 2020-06-29 RX ORDER — MIDAZOLAM HYDROCHLORIDE 1 MG/ML
1 INJECTION INTRAMUSCULAR; INTRAVENOUS
Status: DISCONTINUED | OUTPATIENT
Start: 2020-06-29 | End: 2020-06-29 | Stop reason: HOSPADM

## 2020-06-29 RX ORDER — FLUMAZENIL 0.1 MG/ML
0.2 INJECTION INTRAVENOUS AS NEEDED
Status: DISCONTINUED | OUTPATIENT
Start: 2020-06-29 | End: 2020-06-29 | Stop reason: HOSPADM

## 2020-06-29 RX ORDER — PROPOFOL 10 MG/ML
VIAL (ML) INTRAVENOUS AS NEEDED
Status: DISCONTINUED | OUTPATIENT
Start: 2020-06-29 | End: 2020-06-29 | Stop reason: SURG

## 2020-06-29 RX ORDER — ONDANSETRON 2 MG/ML
4 INJECTION INTRAMUSCULAR; INTRAVENOUS AS NEEDED
Status: DISCONTINUED | OUTPATIENT
Start: 2020-06-29 | End: 2020-06-29 | Stop reason: HOSPADM

## 2020-06-29 RX ORDER — LIDOCAINE HYDROCHLORIDE 10 MG/ML
INJECTION, SOLUTION INFILTRATION; PERINEURAL AS NEEDED
Status: DISCONTINUED | OUTPATIENT
Start: 2020-06-29 | End: 2020-06-29 | Stop reason: HOSPADM

## 2020-06-29 RX ORDER — GABAPENTIN 300 MG/1
300 CAPSULE ORAL 3 TIMES DAILY
COMMUNITY

## 2020-06-29 RX ORDER — LIDOCAINE HYDROCHLORIDE 10 MG/ML
0.5 INJECTION, SOLUTION EPIDURAL; INFILTRATION; INTRACAUDAL; PERINEURAL ONCE AS NEEDED
Status: DISCONTINUED | OUTPATIENT
Start: 2020-06-29 | End: 2020-06-29 | Stop reason: HOSPADM

## 2020-06-29 RX ORDER — SODIUM CHLORIDE 0.9 % (FLUSH) 0.9 %
10 SYRINGE (ML) INJECTION EVERY 12 HOURS SCHEDULED
Status: DISCONTINUED | OUTPATIENT
Start: 2020-06-29 | End: 2020-06-29 | Stop reason: HOSPADM

## 2020-06-29 RX ORDER — EPHEDRINE SULFATE 50 MG/ML
INJECTION INTRAVENOUS AS NEEDED
Status: DISCONTINUED | OUTPATIENT
Start: 2020-06-29 | End: 2020-06-29 | Stop reason: SURG

## 2020-06-29 RX ORDER — OXYCODONE AND ACETAMINOPHEN 10; 325 MG/1; MG/1
1 TABLET ORAL ONCE AS NEEDED
Status: DISCONTINUED | OUTPATIENT
Start: 2020-06-29 | End: 2020-06-29 | Stop reason: HOSPADM

## 2020-06-29 RX ORDER — FENTANYL CITRATE 50 UG/ML
INJECTION, SOLUTION INTRAMUSCULAR; INTRAVENOUS AS NEEDED
Status: DISCONTINUED | OUTPATIENT
Start: 2020-06-29 | End: 2020-06-29 | Stop reason: SURG

## 2020-06-29 RX ORDER — GABAPENTIN 300 MG/1
300 CAPSULE ORAL 3 TIMES DAILY
Status: DISCONTINUED | OUTPATIENT
Start: 2020-06-29 | End: 2020-07-03 | Stop reason: HOSPADM

## 2020-06-29 RX ORDER — HEPARIN SODIUM 1000 [USP'U]/ML
INJECTION, SOLUTION INTRAVENOUS; SUBCUTANEOUS AS NEEDED
Status: DISCONTINUED | OUTPATIENT
Start: 2020-06-29 | End: 2020-06-29 | Stop reason: SURG

## 2020-06-29 RX ORDER — SODIUM CHLORIDE 0.9 % (FLUSH) 0.9 %
10 SYRINGE (ML) INJECTION AS NEEDED
Status: DISCONTINUED | OUTPATIENT
Start: 2020-06-29 | End: 2020-06-29 | Stop reason: HOSPADM

## 2020-06-29 RX ORDER — SODIUM CHLORIDE, SODIUM LACTATE, POTASSIUM CHLORIDE, CALCIUM CHLORIDE 600; 310; 30; 20 MG/100ML; MG/100ML; MG/100ML; MG/100ML
9 INJECTION, SOLUTION INTRAVENOUS CONTINUOUS
Status: DISCONTINUED | OUTPATIENT
Start: 2020-06-29 | End: 2020-06-29

## 2020-06-29 RX ORDER — OXYCODONE AND ACETAMINOPHEN 7.5; 325 MG/1; MG/1
2 TABLET ORAL ONCE AS NEEDED
Status: COMPLETED | OUTPATIENT
Start: 2020-06-29 | End: 2020-06-29

## 2020-06-29 RX ADMIN — PROPOFOL 100 MCG/KG/MIN: 10 INJECTION, EMULSION INTRAVENOUS at 12:01

## 2020-06-29 RX ADMIN — EPHEDRINE SULFATE 15 MG: 50 INJECTION INTRAVENOUS at 14:50

## 2020-06-29 RX ADMIN — FENTANYL CITRATE 50 MCG: 50 INJECTION, SOLUTION INTRAMUSCULAR; INTRAVENOUS at 12:16

## 2020-06-29 RX ADMIN — HYDROCODONE BITARTRATE AND ACETAMINOPHEN 1 TABLET: 10; 325 TABLET ORAL at 03:25

## 2020-06-29 RX ADMIN — SODIUM CHLORIDE 125 ML/HR: 9 INJECTION, SOLUTION INTRAVENOUS at 03:25

## 2020-06-29 RX ADMIN — SODIUM CHLORIDE, POTASSIUM CHLORIDE, SODIUM LACTATE AND CALCIUM CHLORIDE: 600; 310; 30; 20 INJECTION, SOLUTION INTRAVENOUS at 15:07

## 2020-06-29 RX ADMIN — HYDROMORPHONE HYDROCHLORIDE 0.5 MG: 1 INJECTION, SOLUTION INTRAMUSCULAR; INTRAVENOUS; SUBCUTANEOUS at 17:10

## 2020-06-29 RX ADMIN — SODIUM CHLORIDE, POTASSIUM CHLORIDE, SODIUM LACTATE AND CALCIUM CHLORIDE: 600; 310; 30; 20 INJECTION, SOLUTION INTRAVENOUS at 11:51

## 2020-06-29 RX ADMIN — HEPARIN SODIUM 10000 UNITS: 1000 INJECTION, SOLUTION INTRAVENOUS; SUBCUTANEOUS at 16:50

## 2020-06-29 RX ADMIN — FENTANYL CITRATE 200 MCG: 50 INJECTION, SOLUTION INTRAMUSCULAR; INTRAVENOUS at 13:15

## 2020-06-29 RX ADMIN — MIDAZOLAM 5 MG: 1 INJECTION INTRAMUSCULAR; INTRAVENOUS at 12:06

## 2020-06-29 RX ADMIN — PROPOFOL 85 MCG/KG/MIN: 10 INJECTION, EMULSION INTRAVENOUS at 12:32

## 2020-06-29 RX ADMIN — NITROGLYCERIN 1 PATCH: 0.1 PATCH TRANSDERMAL at 17:30

## 2020-06-29 RX ADMIN — HYDROMORPHONE HYDROCHLORIDE 0.5 MG: 1 INJECTION, SOLUTION INTRAMUSCULAR; INTRAVENOUS; SUBCUTANEOUS at 18:12

## 2020-06-29 RX ADMIN — SODIUM CHLORIDE 125 ML/HR: 9 INJECTION, SOLUTION INTRAVENOUS at 20:10

## 2020-06-29 RX ADMIN — PROPOFOL: 10 INJECTION, EMULSION INTRAVENOUS at 13:06

## 2020-06-29 RX ADMIN — HEPARIN SODIUM 1500 UNITS/HR: 10000 INJECTION, SOLUTION INTRAVENOUS at 16:53

## 2020-06-29 RX ADMIN — HYDROMORPHONE HYDROCHLORIDE 0.5 MG: 1 INJECTION, SOLUTION INTRAMUSCULAR; INTRAVENOUS; SUBCUTANEOUS at 04:35

## 2020-06-29 RX ADMIN — ASPIRIN 81 MG: 81 TABLET ORAL at 16:46

## 2020-06-29 RX ADMIN — HYDROCODONE BITARTRATE AND ACETAMINOPHEN 1 TABLET: 10; 325 TABLET ORAL at 22:03

## 2020-06-29 RX ADMIN — HYDROMORPHONE HYDROCHLORIDE 2 MG: 2 INJECTION INTRAMUSCULAR; INTRAVENOUS; SUBCUTANEOUS at 15:06

## 2020-06-29 RX ADMIN — FENTANYL CITRATE 50 MCG: 50 INJECTION, SOLUTION INTRAMUSCULAR; INTRAVENOUS at 12:09

## 2020-06-29 RX ADMIN — HEPARIN SODIUM 1900 UNITS/HR: 10000 INJECTION, SOLUTION INTRAVENOUS at 01:47

## 2020-06-29 RX ADMIN — HYDROMORPHONE HYDROCHLORIDE 0.5 MG: 1 INJECTION, SOLUTION INTRAMUSCULAR; INTRAVENOUS; SUBCUTANEOUS at 00:22

## 2020-06-29 RX ADMIN — HYDROMORPHONE HYDROCHLORIDE 0.5 MG: 1 INJECTION, SOLUTION INTRAMUSCULAR; INTRAVENOUS; SUBCUTANEOUS at 20:21

## 2020-06-29 RX ADMIN — HYDROMORPHONE HYDROCHLORIDE 0.5 MG: 1 INJECTION, SOLUTION INTRAMUSCULAR; INTRAVENOUS; SUBCUTANEOUS at 09:53

## 2020-06-29 RX ADMIN — HEPARIN SODIUM 5000 UNITS: 1000 INJECTION, SOLUTION INTRAVENOUS; SUBCUTANEOUS at 12:31

## 2020-06-29 RX ADMIN — HYDROMORPHONE HYDROCHLORIDE 0.5 MG: 1 INJECTION, SOLUTION INTRAMUSCULAR; INTRAVENOUS; SUBCUTANEOUS at 23:14

## 2020-06-29 RX ADMIN — HYDROMORPHONE HYDROCHLORIDE 0.5 MG: 1 INJECTION, SOLUTION INTRAMUSCULAR; INTRAVENOUS; SUBCUTANEOUS at 07:42

## 2020-06-29 RX ADMIN — Medication 2 G: at 12:05

## 2020-06-29 RX ADMIN — LIDOCAINE HYDROCHLORIDE 100 MG: 20 INJECTION, SOLUTION INTRAVENOUS at 12:05

## 2020-06-29 RX ADMIN — HEPARIN SODIUM 10000 UNITS: 1000 INJECTION, SOLUTION INTRAVENOUS; SUBCUTANEOUS at 01:46

## 2020-06-29 RX ADMIN — GABAPENTIN 300 MG: 300 CAPSULE ORAL at 17:24

## 2020-06-29 RX ADMIN — OXYCODONE HYDROCHLORIDE AND ACETAMINOPHEN 2 TABLET: 7.5; 325 TABLET ORAL at 16:46

## 2020-06-29 RX ADMIN — HEPARIN SODIUM 5000 UNITS: 1000 INJECTION, SOLUTION INTRAVENOUS; SUBCUTANEOUS at 23:30

## 2020-06-29 RX ADMIN — HYDROCODONE BITARTRATE AND ACETAMINOPHEN 1 TABLET: 10; 325 TABLET ORAL at 07:50

## 2020-06-29 RX ADMIN — ONDANSETRON HYDROCHLORIDE 4 MG: 2 SOLUTION INTRAMUSCULAR; INTRAVENOUS at 16:07

## 2020-06-29 RX ADMIN — PROPOFOL 60 MG: 10 INJECTION, EMULSION INTRAVENOUS at 12:05

## 2020-06-29 RX ADMIN — HEPARIN SODIUM 2000 UNITS: 1000 INJECTION, SOLUTION INTRAVENOUS; SUBCUTANEOUS at 13:46

## 2020-06-29 RX ADMIN — FENTANYL CITRATE 100 MCG: 50 INJECTION, SOLUTION INTRAMUSCULAR; INTRAVENOUS at 16:07

## 2020-06-29 NOTE — ANESTHESIA PROCEDURE NOTES
Airway  Urgency: elective    Airway not difficult    General Information and Staff    Patient location during procedure: OR  CRNA: Perico Steiner CRNA    Indications and Patient Condition  Indications for airway management: airway protection    Preoxygenated: yes  Mask difficulty assessment: 0 - not attempted    Final Airway Details  Final airway type: supraglottic airway      Successful airway: classic and I-gel  Size 4  Airway Seal Pressure (cm H2O): 20    Number of attempts at approach: 2  Assessment: lips, teeth, and gum same as pre-op

## 2020-06-29 NOTE — ANESTHESIA POSTPROCEDURE EVALUATION
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        Post Anesthesia Care and Evaluation    Patient location during evaluation: PACU  Patient participation: complete - patient participated  Level of consciousness: awake and alert  Pain management: adequate  Airway patency: patent  Anesthetic complications: No anesthetic complications    Cardiovascular status: acceptable  Respiratory status: acceptable  Hydration status: acceptable    Comments: Blood pressure 144/91, pulse 83, temperature 97.9 °F (36.6 °C), temperature source Oral, resp. rate 16, height 185.4 cm (72.99\"), weight 133 kg (293 lb 3.4 oz), SpO2 94 %.    Pt discharged from PACU based on kody score >8      "

## 2020-06-29 NOTE — ANESTHESIA PREPROCEDURE EVALUATION
Anesthesia Evaluation     Patient summary reviewed   no history of anesthetic complications:  NPO Solid Status: > 8 hours             Airway   Mallampati: II  TM distance: >3 FB  Neck ROM: full  Dental      Pulmonary    (-) COPD, asthma, sleep apnea, not a smoker  Cardiovascular   Exercise tolerance: excellent (>7 METS)    (+) PVD,   (-) pacemaker, past MI, angina, cardiac stents      Neuro/Psych  (-) seizures, TIA, CVA  GI/Hepatic/Renal/Endo    (-) GERD, liver disease, no renal disease, diabetes    Musculoskeletal     Abdominal    Substance History      OB/GYN          Other                        Anesthesia Plan    ASA 2     general     intravenous induction     Anesthetic plan, all risks, benefits, and alternatives have been provided, discussed and informed consent has been obtained with: patient.

## 2020-06-30 LAB
ANION GAP SERPL CALCULATED.3IONS-SCNC: 11 MMOL/L (ref 5–15)
APTT PPP: 50.6 SECONDS (ref 24.1–35)
APTT PPP: 57.7 SECONDS (ref 24.1–35)
APTT PPP: 84.8 SECONDS (ref 24.1–35)
BASOPHILS # BLD AUTO: 0.01 10*3/MM3 (ref 0–0.2)
BASOPHILS NFR BLD AUTO: 0.2 % (ref 0–1.5)
BUN SERPL-MCNC: 9 MG/DL (ref 6–20)
BUN/CREAT SERPL: 9.3 (ref 7–25)
CALCIUM SPEC-SCNC: 8.6 MG/DL (ref 8.6–10.5)
CHLORIDE SERPL-SCNC: 104 MMOL/L (ref 98–107)
CO2 SERPL-SCNC: 25 MMOL/L (ref 22–29)
CREAT SERPL-MCNC: 0.97 MG/DL (ref 0.76–1.27)
DEPRECATED RDW RBC AUTO: 42.1 FL (ref 37–54)
EOSINOPHIL # BLD AUTO: 0.04 10*3/MM3 (ref 0–0.4)
EOSINOPHIL NFR BLD AUTO: 0.7 % (ref 0.3–6.2)
ERYTHROCYTE [DISTWIDTH] IN BLOOD BY AUTOMATED COUNT: 12.8 % (ref 12.3–15.4)
GFR SERPL CREATININE-BSD FRML MDRD: 90 ML/MIN/1.73
GLUCOSE SERPL-MCNC: 125 MG/DL (ref 65–99)
HCT VFR BLD AUTO: 36 % (ref 37.5–51)
HGB BLD-MCNC: 12.5 G/DL (ref 13–17.7)
IMM GRANULOCYTES # BLD AUTO: 0.03 10*3/MM3 (ref 0–0.05)
IMM GRANULOCYTES NFR BLD AUTO: 0.5 % (ref 0–0.5)
LYMPHOCYTES # BLD AUTO: 0.48 10*3/MM3 (ref 0.7–3.1)
LYMPHOCYTES NFR BLD AUTO: 8.2 % (ref 19.6–45.3)
MCH RBC QN AUTO: 31.3 PG (ref 26.6–33)
MCHC RBC AUTO-ENTMCNC: 34.7 G/DL (ref 31.5–35.7)
MCV RBC AUTO: 90 FL (ref 79–97)
MONOCYTES # BLD AUTO: 0.63 10*3/MM3 (ref 0.1–0.9)
MONOCYTES NFR BLD AUTO: 10.8 % (ref 5–12)
NEUTROPHILS NFR BLD AUTO: 4.65 10*3/MM3 (ref 1.7–7)
NEUTROPHILS NFR BLD AUTO: 79.6 % (ref 42.7–76)
NRBC BLD AUTO-RTO: 0 /100 WBC (ref 0–0.2)
PLATELET # BLD AUTO: 226 10*3/MM3 (ref 140–450)
PMV BLD AUTO: 9.6 FL (ref 6–12)
POTASSIUM SERPL-SCNC: 3.7 MMOL/L (ref 3.5–5.2)
RBC # BLD AUTO: 4 10*6/MM3 (ref 4.14–5.8)
SODIUM SERPL-SCNC: 140 MMOL/L (ref 136–145)
WBC # BLD AUTO: 5.84 10*3/MM3 (ref 3.4–10.8)

## 2020-06-30 PROCEDURE — 80048 BASIC METABOLIC PNL TOTAL CA: CPT | Performed by: SURGERY

## 2020-06-30 PROCEDURE — 25010000002 HEPARIN (PORCINE) PER 1000 UNITS: Performed by: SURGERY

## 2020-06-30 PROCEDURE — 25010000002 HYDROMORPHONE PER 4 MG: Performed by: SURGERY

## 2020-06-30 PROCEDURE — 85025 COMPLETE CBC W/AUTO DIFF WBC: CPT | Performed by: SURGERY

## 2020-06-30 PROCEDURE — 36415 COLL VENOUS BLD VENIPUNCTURE: CPT | Performed by: SURGERY

## 2020-06-30 PROCEDURE — 85730 THROMBOPLASTIN TIME PARTIAL: CPT | Performed by: SURGERY

## 2020-06-30 PROCEDURE — 99253 IP/OBS CNSLTJ NEW/EST LOW 45: CPT | Performed by: PODIATRIST

## 2020-06-30 PROCEDURE — 99024 POSTOP FOLLOW-UP VISIT: CPT | Performed by: SURGERY

## 2020-06-30 RX ADMIN — GABAPENTIN 300 MG: 300 CAPSULE ORAL at 21:05

## 2020-06-30 RX ADMIN — HYDROMORPHONE HYDROCHLORIDE 0.5 MG: 1 INJECTION, SOLUTION INTRAMUSCULAR; INTRAVENOUS; SUBCUTANEOUS at 12:46

## 2020-06-30 RX ADMIN — HYDROCODONE BITARTRATE AND ACETAMINOPHEN 1 TABLET: 10; 325 TABLET ORAL at 16:07

## 2020-06-30 RX ADMIN — HEPARIN SODIUM 1600 UNITS/HR: 10000 INJECTION, SOLUTION INTRAVENOUS at 00:59

## 2020-06-30 RX ADMIN — HYDROMORPHONE HYDROCHLORIDE 0.5 MG: 1 INJECTION, SOLUTION INTRAMUSCULAR; INTRAVENOUS; SUBCUTANEOUS at 14:48

## 2020-06-30 RX ADMIN — HEPARIN SODIUM 10000 UNITS: 1000 INJECTION, SOLUTION INTRAVENOUS; SUBCUTANEOUS at 07:36

## 2020-06-30 RX ADMIN — HYDROMORPHONE HYDROCHLORIDE 0.5 MG: 1 INJECTION, SOLUTION INTRAMUSCULAR; INTRAVENOUS; SUBCUTANEOUS at 04:07

## 2020-06-30 RX ADMIN — GABAPENTIN 300 MG: 300 CAPSULE ORAL at 16:07

## 2020-06-30 RX ADMIN — HYDROMORPHONE HYDROCHLORIDE 0.5 MG: 1 INJECTION, SOLUTION INTRAMUSCULAR; INTRAVENOUS; SUBCUTANEOUS at 08:17

## 2020-06-30 RX ADMIN — HYDROCODONE BITARTRATE AND ACETAMINOPHEN 1 TABLET: 10; 325 TABLET ORAL at 11:49

## 2020-06-30 RX ADMIN — SODIUM CHLORIDE 125 ML/HR: 9 INJECTION, SOLUTION INTRAVENOUS at 04:08

## 2020-06-30 RX ADMIN — HYDROMORPHONE HYDROCHLORIDE 0.5 MG: 1 INJECTION, SOLUTION INTRAMUSCULAR; INTRAVENOUS; SUBCUTANEOUS at 19:59

## 2020-06-30 RX ADMIN — SODIUM CHLORIDE 125 ML/HR: 9 INJECTION, SOLUTION INTRAVENOUS at 12:46

## 2020-06-30 RX ADMIN — ASPIRIN 81 MG: 81 TABLET ORAL at 10:08

## 2020-06-30 RX ADMIN — HEPARIN SODIUM 1800 UNITS/HR: 10000 INJECTION, SOLUTION INTRAVENOUS at 14:50

## 2020-06-30 RX ADMIN — HEPARIN SODIUM 5000 UNITS: 1000 INJECTION, SOLUTION INTRAVENOUS; SUBCUTANEOUS at 19:45

## 2020-06-30 RX ADMIN — HYDROMORPHONE HYDROCHLORIDE 0.5 MG: 1 INJECTION, SOLUTION INTRAMUSCULAR; INTRAVENOUS; SUBCUTANEOUS at 10:06

## 2020-06-30 RX ADMIN — HYDROMORPHONE HYDROCHLORIDE 0.5 MG: 1 INJECTION, SOLUTION INTRAMUSCULAR; INTRAVENOUS; SUBCUTANEOUS at 17:51

## 2020-06-30 RX ADMIN — HYDROMORPHONE HYDROCHLORIDE 0.5 MG: 1 INJECTION, SOLUTION INTRAMUSCULAR; INTRAVENOUS; SUBCUTANEOUS at 22:14

## 2020-06-30 RX ADMIN — HYDROCODONE BITARTRATE AND ACETAMINOPHEN 1 TABLET: 10; 325 TABLET ORAL at 07:35

## 2020-06-30 RX ADMIN — SODIUM CHLORIDE 125 ML/HR: 9 INJECTION, SOLUTION INTRAVENOUS at 21:07

## 2020-06-30 RX ADMIN — HYDROCODONE BITARTRATE AND ACETAMINOPHEN 1 TABLET: 10; 325 TABLET ORAL at 21:05

## 2020-06-30 RX ADMIN — GABAPENTIN 300 MG: 300 CAPSULE ORAL at 10:06

## 2020-06-30 RX ADMIN — HYDROMORPHONE HYDROCHLORIDE 0.5 MG: 1 INJECTION, SOLUTION INTRAMUSCULAR; INTRAVENOUS; SUBCUTANEOUS at 01:30

## 2020-06-30 RX ADMIN — NITROGLYCERIN 1 PATCH: 0.1 PATCH TRANSDERMAL at 10:08

## 2020-07-01 LAB
ANION GAP SERPL CALCULATED.3IONS-SCNC: 10 MMOL/L (ref 5–15)
APTT PPP: 69.7 SECONDS (ref 24.1–35)
APTT PPP: 81.3 SECONDS (ref 24.1–35)
BASOPHILS # BLD AUTO: 0.02 10*3/MM3 (ref 0–0.2)
BASOPHILS NFR BLD AUTO: 0.4 % (ref 0–1.5)
BUN SERPL-MCNC: 7 MG/DL (ref 6–20)
BUN/CREAT SERPL: 8.4 (ref 7–25)
CALCIUM SPEC-SCNC: 8.9 MG/DL (ref 8.6–10.5)
CHLORIDE SERPL-SCNC: 105 MMOL/L (ref 98–107)
CO2 SERPL-SCNC: 25 MMOL/L (ref 22–29)
CREAT SERPL-MCNC: 0.83 MG/DL (ref 0.76–1.27)
DEPRECATED RDW RBC AUTO: 40.5 FL (ref 37–54)
EOSINOPHIL # BLD AUTO: 0.08 10*3/MM3 (ref 0–0.4)
EOSINOPHIL NFR BLD AUTO: 1.4 % (ref 0.3–6.2)
ERYTHROCYTE [DISTWIDTH] IN BLOOD BY AUTOMATED COUNT: 12.6 % (ref 12.3–15.4)
GFR SERPL CREATININE-BSD FRML MDRD: 107 ML/MIN/1.73
GLUCOSE SERPL-MCNC: 122 MG/DL (ref 65–99)
HCT VFR BLD AUTO: 34.5 % (ref 37.5–51)
HGB BLD-MCNC: 12.4 G/DL (ref 13–17.7)
IMM GRANULOCYTES # BLD AUTO: 0.03 10*3/MM3 (ref 0–0.05)
IMM GRANULOCYTES NFR BLD AUTO: 0.5 % (ref 0–0.5)
LYMPHOCYTES # BLD AUTO: 0.78 10*3/MM3 (ref 0.7–3.1)
LYMPHOCYTES NFR BLD AUTO: 13.8 % (ref 19.6–45.3)
MCH RBC QN AUTO: 31.5 PG (ref 26.6–33)
MCHC RBC AUTO-ENTMCNC: 35.9 G/DL (ref 31.5–35.7)
MCV RBC AUTO: 87.6 FL (ref 79–97)
MONOCYTES # BLD AUTO: 0.72 10*3/MM3 (ref 0.1–0.9)
MONOCYTES NFR BLD AUTO: 12.7 % (ref 5–12)
NEUTROPHILS NFR BLD AUTO: 4.03 10*3/MM3 (ref 1.7–7)
NEUTROPHILS NFR BLD AUTO: 71.2 % (ref 42.7–76)
NRBC BLD AUTO-RTO: 0 /100 WBC (ref 0–0.2)
PLATELET # BLD AUTO: 197 10*3/MM3 (ref 140–450)
PMV BLD AUTO: 9.4 FL (ref 6–12)
POTASSIUM SERPL-SCNC: 4 MMOL/L (ref 3.5–5.2)
RBC # BLD AUTO: 3.94 10*6/MM3 (ref 4.14–5.8)
SODIUM SERPL-SCNC: 140 MMOL/L (ref 136–145)
WBC # BLD AUTO: 5.66 10*3/MM3 (ref 3.4–10.8)

## 2020-07-01 PROCEDURE — 99231 SBSQ HOSP IP/OBS SF/LOW 25: CPT | Performed by: NURSE PRACTITIONER

## 2020-07-01 PROCEDURE — 85730 THROMBOPLASTIN TIME PARTIAL: CPT | Performed by: SURGERY

## 2020-07-01 PROCEDURE — 25010000002 HEPARIN (PORCINE) PER 1000 UNITS: Performed by: SURGERY

## 2020-07-01 PROCEDURE — 99024 POSTOP FOLLOW-UP VISIT: CPT | Performed by: SURGERY

## 2020-07-01 PROCEDURE — 25010000002 HYDROMORPHONE PER 4 MG: Performed by: SURGERY

## 2020-07-01 PROCEDURE — 80048 BASIC METABOLIC PNL TOTAL CA: CPT | Performed by: SURGERY

## 2020-07-01 PROCEDURE — 85025 COMPLETE CBC W/AUTO DIFF WBC: CPT | Performed by: SURGERY

## 2020-07-01 PROCEDURE — 36415 COLL VENOUS BLD VENIPUNCTURE: CPT | Performed by: SURGERY

## 2020-07-01 RX ADMIN — HYDROCODONE BITARTRATE AND ACETAMINOPHEN 1 TABLET: 10; 325 TABLET ORAL at 06:50

## 2020-07-01 RX ADMIN — NITROGLYCERIN 1 PATCH: 0.1 PATCH TRANSDERMAL at 08:44

## 2020-07-01 RX ADMIN — HYDROMORPHONE HYDROCHLORIDE 0.5 MG: 1 INJECTION, SOLUTION INTRAMUSCULAR; INTRAVENOUS; SUBCUTANEOUS at 14:30

## 2020-07-01 RX ADMIN — ASPIRIN 81 MG: 81 TABLET ORAL at 08:44

## 2020-07-01 RX ADMIN — HYDROMORPHONE HYDROCHLORIDE 0.5 MG: 1 INJECTION, SOLUTION INTRAMUSCULAR; INTRAVENOUS; SUBCUTANEOUS at 17:26

## 2020-07-01 RX ADMIN — HYDROMORPHONE HYDROCHLORIDE 0.5 MG: 1 INJECTION, SOLUTION INTRAMUSCULAR; INTRAVENOUS; SUBCUTANEOUS at 20:41

## 2020-07-01 RX ADMIN — GABAPENTIN 300 MG: 300 CAPSULE ORAL at 15:41

## 2020-07-01 RX ADMIN — HYDROCODONE BITARTRATE AND ACETAMINOPHEN 1 TABLET: 10; 325 TABLET ORAL at 19:23

## 2020-07-01 RX ADMIN — HYDROMORPHONE HYDROCHLORIDE 0.5 MG: 1 INJECTION, SOLUTION INTRAMUSCULAR; INTRAVENOUS; SUBCUTANEOUS at 04:23

## 2020-07-01 RX ADMIN — GABAPENTIN 300 MG: 300 CAPSULE ORAL at 08:44

## 2020-07-01 RX ADMIN — HYDROMORPHONE HYDROCHLORIDE 0.5 MG: 1 INJECTION, SOLUTION INTRAMUSCULAR; INTRAVENOUS; SUBCUTANEOUS at 12:30

## 2020-07-01 RX ADMIN — HEPARIN SODIUM 1900 UNITS/HR: 10000 INJECTION, SOLUTION INTRAVENOUS at 04:47

## 2020-07-01 RX ADMIN — SODIUM CHLORIDE, PRESERVATIVE FREE 10 ML: 5 INJECTION INTRAVENOUS at 20:26

## 2020-07-01 RX ADMIN — GABAPENTIN 300 MG: 300 CAPSULE ORAL at 20:25

## 2020-07-01 RX ADMIN — SODIUM CHLORIDE 125 ML/HR: 9 INJECTION, SOLUTION INTRAVENOUS at 04:24

## 2020-07-01 RX ADMIN — RIVAROXABAN 20 MG: 20 TABLET, FILM COATED ORAL at 12:17

## 2020-07-01 RX ADMIN — HYDROCODONE BITARTRATE AND ACETAMINOPHEN 1 TABLET: 10; 325 TABLET ORAL at 15:41

## 2020-07-01 RX ADMIN — HYDROMORPHONE HYDROCHLORIDE 0.5 MG: 1 INJECTION, SOLUTION INTRAMUSCULAR; INTRAVENOUS; SUBCUTANEOUS at 08:45

## 2020-07-01 RX ADMIN — HYDROMORPHONE HYDROCHLORIDE 0.5 MG: 1 INJECTION, SOLUTION INTRAMUSCULAR; INTRAVENOUS; SUBCUTANEOUS at 22:51

## 2020-07-01 RX ADMIN — HYDROMORPHONE HYDROCHLORIDE 0.5 MG: 1 INJECTION, SOLUTION INTRAMUSCULAR; INTRAVENOUS; SUBCUTANEOUS at 00:23

## 2020-07-01 RX ADMIN — HYDROCODONE BITARTRATE AND ACETAMINOPHEN 1 TABLET: 10; 325 TABLET ORAL at 02:32

## 2020-07-01 RX ADMIN — HYDROCODONE BITARTRATE AND ACETAMINOPHEN 1 TABLET: 10; 325 TABLET ORAL at 10:54

## 2020-07-01 NOTE — PROGRESS NOTES
LOS: 4 days   Patient Care Team:  Alee Judge PA as PCP - General (Physician Assistant)    Chief Complaint: Right foot pain    Subjective     Patient seen and examined at bedside.  No acute events overnight.  Is postoperative day #2 following aortoiliac angiogram with stenting of the right common iliac artery, right groin cutdown and attempted thrombectomy and angioplasty of the right anterior tibial, dorsalis pedis and posterior tibial arteries.  Patient remained on heparin overnight and continues to complain of some pain in the right distal forefoot and toes but this is slightly improved from yesterday.  He also continues to have burning sensations across the digits of the right foot and is on gabapentin for this.  He continues to have ischemic type changes to the distal forefoot and toes, but this is also improved today from yesterday.  He did ambulate with the help of a walker yesterday.  He was seen by podiatry yesterday who recommended continued observation to watch for potential demarcation of any ischemic areas but feel that if necessary he may be a candidate for a TMA.  He otherwise is without any new complaints today.    Objective     Vital Signs  Temp:  [97.5 °F (36.4 °C)-99.7 °F (37.6 °C)] 98.3 °F (36.8 °C)  Heart Rate:  [67-82] 69  Resp:  [16-18] 18  BP: (125-141)/(56-82) 141/82    Physical Exam   Constitutional: He is oriented to person, place, and time. He appears well-developed and well-nourished.   HENT:   Head: Normocephalic and atraumatic.   Eyes: Pupils are equal, round, and reactive to light. EOM are normal.   Neck: Normal range of motion. Neck supple. No JVD present.   Cardiovascular: Normal rate and regular rhythm.   Pulses:       Carotid pulses are 2+ on the right side, and 2+ on the left side.       Radial pulses are 2+ on the right side, and 2+ on the left side.        Femoral pulses are 2+ on the right side, and 2+ on the left side.       Popliteal pulses are 2+ on the right side,  and 2+ on the left side.        Dorsalis pedis pulses are 0 on the right side, and 2+ on the left side.        Posterior tibial pulses are 0 on the right side, and 2+ on the left side.   Pulse exam unchanged today from presentation.  He does not have palpable pulses in the right DP or PT. however he does have Doppler signals present in the right DP and PT that remain monophasic but easily audible.  He continues to have palpable 2+ femoral and popliteal pulses bilaterally.  In the left foot he does have palpable DP and PT pulses.    Right foot continues to be cooler to touch than the left at the mid/distal forefoot and toes.  He has dusky skin changes and signs of ischemia of the right distal forefoot and toes over this is slightly improved from yesterday to today. Motor and sensation remains intact and unchanged from yesterday.  There is tenderness to palpation of the distal forefoot at the base of the toes.    His right groin cutdown incision has dressing in place and is clean and dry.  He has some mild tenderness to the area but otherwise no evidence of any hematoma.  His left groin percutaneous access site is soft with no evidence of hematoma and minimal tenderness.   Pulmonary/Chest: Effort normal. No respiratory distress.   Abdominal: Soft. He exhibits no distension and no mass. There is no tenderness.   Musculoskeletal: Normal range of motion. He exhibits edema (Some right foot edema as above.) and tenderness (Some tenderness of the distal right forefoot as above.). He exhibits no deformity.   Neurological: He is alert and oriented to person, place, and time. No sensory deficit. He exhibits normal muscle tone.   Skin: Skin is warm and dry. Less than 2 seconds in the left foot, greater than 3 seconds of the right distal forefoot and toes..   Psychiatric: He has a normal mood and affect. His behavior is normal. Judgment and thought content normal.   Vitals reviewed.      Laboratory Data:   Results from last 7  days   Lab Units 07/01/20  0528 06/30/20  0550 06/29/20  1555   WBC 10*3/mm3 5.66 5.84 4.83   HEMOGLOBIN g/dL 12.4* 12.5* 14.1   HEMATOCRIT % 34.5* 36.0* 39.5   PLATELETS 10*3/mm3 197 226 242       Results from last 7 days   Lab Units 07/01/20  0528 06/30/20  0550 06/29/20  1556  06/27/20  1332   SODIUM mmol/L 140 140 139   < > 139   POTASSIUM mmol/L 4.0 3.7 4.9   < > 4.5   CHLORIDE mmol/L 105 104 104   < > 100   CO2 mmol/L 25.0 25.0 24.0   < > 27.0   BUN mg/dL 7 9 10   < > 15   CREATININE mg/dL 0.83 0.97 1.13   < > 1.08   CALCIUM mg/dL 8.9 8.6 9.0   < > 9.6   BILIRUBIN mg/dL  --   --   --   --  0.7   ALK PHOS U/L  --   --   --   --  63   ALT (SGPT) U/L  --   --   --   --  70*   AST (SGOT) U/L  --   --   --   --  48*   GLUCOSE mg/dL 122* 125* 110*   < > 86    < > = values in this interval not displayed.     Results from last 7 days   Lab Units 07/01/20  0528 07/01/20  0000 06/30/20  1857  06/27/20  1332   INR   --   --   --   --  1.06   APTT seconds 69.7* 81.3* 57.7*   < > 30.3    < > = values in this interval not displayed.            Medication Review: Reviewed, no changes.  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  At this point plan is to continue with conservative treatment and observation to see how his right foot does.  He was seen by podiatry yesterday and I appreciate their input.  They feel that any tissue involvement is not full-thickness and we should continue to watch for demarcation however he may be candidate for TMA in the future if needed.  At this point plan will be to transition him from the heparin drip back to Xarelto 20 mg daily for full anticoagulation.  He  would also stay on aspirin 81 mg daily.  He can be out of bed as tolerated and I will have PT see him today for further education on ambulating with crutches or a walker as needed.  We will continue with pain control as needed and can plan for potential discharge as early as tomorrow depending on his continued clinical course.    Plan for disposition: Continued inpatient care.    Rj Rios MD  Vascular Surgery  [phone]  07/01/20  10:53

## 2020-07-01 NOTE — PROGRESS NOTES
[facility] - PODIATRY    Today's Date: 07/01/20    Patient Name: Binh Bowen  MRN: [id number]  CSN: [id number]  PCP: Alee Judge PA  Referring Provider: No ref. provider found  Attending Provider: Rj Rios MD  Length of Stay: 4    SUBJECTIVE   Chief Complaint: Right foot pain     HPI: Binh Bowen, a 30 y.o.male, followed for ischemic changes to right foot and toes. Patient reports pain overnight including numbness, tingling, burning, and sharp shooting pains in the foot and toes. Was started on Gabapentin yesterday but denies any improvement at this time. Nitro patch not in place on foot this morning. Patient and nursing note indicate that DP pulse was able to be found with doppler and was 2+. Has been ambulatory in room with walker. Labs stable. Denies any constitutional symptoms. No other pedal complaints at this time.    History reviewed. No pertinent past medical history.  Past Surgical History:   Procedure Laterality Date   • AORTAGRAM Right 6/29/2020    Procedure: AORTAGRAM, RIGHT LOWER EXTREMITY ANGIOGRAM, RIGHT ILIAC STENT PLACEMENT, BALLOON ANGIOPLASTY, RIGHT LOWER EXTREMITY THROMBECTOMY;  Surgeon: Rj Rios MD;  Location: [place] [address];  Service: Vascular;  Laterality: Right;   • VASCULAR SURGERY  06/08/2020    About two weeks ago Sheath placement throught L groin at [place].     History reviewed. No pertinent family history.  Social History     Socioeconomic History   • Marital status: Single     Spouse name: Not on file   • Number of children: Not on file   • Years of education: Not on file   • Highest education level: Not on file   Tobacco Use   • Smoking status: Never Smoker   • Smokeless tobacco: Never Used   Substance and Sexual Activity   • Alcohol use: Not Currently   • Drug use: Not Currently   • Sexual activity: Yes     Partners: Female     No Known Allergies  Current Facility-Administered Medications   Medication Dose Route Frequency  Provider Last Rate Last Dose   • aspirin EC tablet 81 mg  81 mg Oral Daily Rj Rios MD   81 mg at 07/01/20 0844   • gabapentin (NEURONTIN) capsule 300 mg  300 mg Oral TID Rj Rios MD   300 mg at 07/01/20 0844   • heparin (porcine) injection 10,000 Units  10,000 Units Intravenous PRN Rj Rios MD   10,000 Units at 06/30/20 0736   • heparin (porcine) injection 5,000 Units  5,000 Units Intravenous PRN Rj Rios MD   5,000 Units at 06/30/20 1945   • heparin 92822 units/250 mL (100 units/mL) in 0.45 % NaCl infusion  1,500 Units/hr Intravenous Titrated Rj Rios MD 19 mL/hr at 07/01/20 0447 1,900 Units/hr at 07/01/20 0447   • HYDROcodone-acetaminophen (NORCO)  MG per tablet 1 tablet  1 tablet Oral Q4H PRN Rj Rios MD   1 tablet at 07/01/20 0650   • HYDROmorphone (DILAUDID) injection 0.5 mg  0.5 mg Intravenous Q2H PRN Rj Rios MD   0.5 mg at 07/01/20 0845    And   • naloxone (NARCAN) injection 0.4 mg  0.4 mg Intravenous Q5 Min PRN Rj Rios MD       • nitroglycerin (NITRODUR) 0.1 MG/HR patch 1 patch  1 patch Transdermal Daily Rj Rios MD   1 patch at 07/01/20 0844   • ondansetron (ZOFRAN) tablet 4 mg  4 mg Oral Q6H PRN Rj Rios MD        Or   • ondansetron (ZOFRAN) injection 4 mg  4 mg Intravenous Q6H PRN Rj Rios MD       • sodium chloride 0.9 % flush 10 mL  10 mL Intravenous Q12H Rj Rios MD       • sodium chloride 0.9 % flush 10 mL  10 mL Intravenous PRN Rj Rios MD         Review of Systems   Constitutional: Negative for chills and fever.   HENT: Negative for congestion.    Respiratory: Negative for shortness of breath.    Cardiovascular: Positive for leg swelling. Negative for chest pain.   Gastrointestinal: Negative for constipation, diarrhea, nausea and vomiting.   Musculoskeletal: Positive for myalgias. Negative for arthralgias.         Right foot pain   Skin: Positive for color change. Negative for wound.   Neurological: Positive for numbness.       OBJECTIVE     Vitals:    20 0732   BP: 141/82   Pulse: 69   Resp: 18   Temp: 98.3 °F (36.8 °C)   SpO2: 98%       PHYSICAL EXAM  GEN:   A&Ox3, NAD. Pt presents in hospital bed. Accompanied by family.     Foot/Ankle Exam:       General:   Appearance: appears stated age and healthy    Orientation: AAOx3    Affect: appropriate    Gait: antalgic    Assistance: walker and independent    Shoe Gear:  Socks    VASCULAR      Right Foot Vascularity   Dorsalis pedis:  Doppler (weak monophasic 1+ noted)  Posterior tibial:  Doppler (biphasic 2+)  Skin Temperature: cool    Edema Gradin+ and non-pitting  CFT:  4  Pedal Hair Growth:  Present  Varicosities: none       Left Foot Vascularity   Dorsalis pedis:  2+  Posterior tibial:  2+  Skin Temperature: warm    Edema Grading:  None  CFT:  3  Pedal Hair Growth:  Present  Varicosities: none        NEUROLOGIC     Right Foot Neurologic   Normal sensation    Light touch sensation:  Normal  Vibratory sensation:  Normal  Hot/Cold sensation: normal       Left Foot Neurologic   Normal sensation    Light touch sensation:  Normal  Vibratory sensation:  Normal  Hot/cold sensation: normal       MUSCULOSKELETAL      Right Foot Musculoskeletal    Amputation   Right toes amputated: No    Ecchymosis:  None  Tenderness comment:  Diffuse tenderness to distal 1/3 of foot of both plantar and dorsal surfaces  Arch:  Normal     Left Foot Musculoskeletal    Amputation   Left toes amputated: No    Ecchymosis:  None  Tenderness: none    Arch:  Normal     MUSCLE STRENGTH     Right Foot Muscle Strength   Foot dorsiflexion:  5  Foot plantar flexion:  5  Foot inversion:  5  Foot eversion:  5     Left Foot Muscle Strength   Foot dorsiflexion:  5  Foot plantar flexion:  5  Foot inversion:  5  Foot eversion:  5     RANGE OF MOTION      Right Foot Range of Motion   Foot and ankle ROM within  normal limits       Left Foot Range of Motion   Foot and ankle ROM within normal limits       DERMATOLOGIC     Right Foot Dermatologic   Skin: color abnormal and skin changes    Skin comment:  Hyperemic appearance of distal 2/3 of foot. Improved color today of distal 1/3 foot and toes with improved cap refill  Nails: normal       Left Foot Dermatologic   Skin: skin intact    Nails: normal         RADIOLOGY/NUCLEAR:  Ct Angiogram Chest With & Without Contrast    Result Date: 6/28/2020  Narrative: EXAMINATION: CT ANGIOGRAM CHEST W WO CONTRAST- 6/28/2020 12:12 PM CDT  HISTORY: Eval for possible source of embolus; I70.209-Unspecified atherosclerosis of native arteries of extremities, unspecified extremity  DOSE: 663 mGycm (Automatic exposure control technique was implemented in an effort to keep the radiation dose as low as possible without compromising image quality)  REPORT: Spiral CT of the chest was performed with and without intravenous contrast from the thoracic inlet through the upper abdomen using CTA protocol. Reconstructed 3-D, coronal and sagittal images were also reviewed.  Comparison: There are no correlative imaging studies for comparison.  The contrast bolus is satisfactory. The pulmonary arteries are normal caliber, without Aquino defects. There is no evidence of right heart strain. The aorta is normal in caliber, with ectasia of the ascending aorta, which has a maximum diameter of 3.3 cm. There is bilateral gynecomastia. No intrathoracic lymphadenopathy is identified. The visualized thyroid gland is unremarkable. There are calcified subcarinal lymph nodes, small calcified left hilar lymph nodes compatible with healed granulomatous disease. No pneumothorax or pleural effusion is identified. Lung windows demonstrate respiratory motion artifact. There is mild atelectasis in the medial basal segment on the right, associated with spurring on the right side of the spine. There is no pulmonary consolidation or  pneumonia. There is a small calcified granuloma in the left lower lobe. In the upper abdomen, the spleen is enlarged, with a maximum dimension of 13 x 19 cm. Review of bone windows is unremarkable.      Impression: 1. No acute intrathoracic abnormality, no evidence of pulmonary embolism. 2. Splenomegaly, the spleen measures 13 x 19 cm. The spleen is homogeneous. 3. Mild ectasia of the ascending aorta but with a diameter of only 3.3 cm. No significant atherosclerotic disease of the aorta. 4. Evidence of healed granulomatous disease. 4. Mild bilateral gynecomastia.     This report was finalized on 06/28/2020 12:23 by Dr. Jack Nix MD.    Ct Angiogram Lower Extremity Right    Result Date: 6/27/2020  Narrative: EXAMINATION: CT ANGIOGRAM LOWER EXTREMITY RIGHT- 6/27/2020 3:31 PM CDT  HISTORY: Recently dx with Clots in [place], now blue toes, cool foot. Lower extremity arterial insufficiency.  DOSE: 871 mGycm (Automatic exposure control technique was implemented in an effort to keep the radiation dose as low as possible without compromising image quality)  REPORT: Spiral CT of the right lower extremity was performed after administration of intravenous contrast, using CTA protocol, which includes reconstructed coronal, sagittal and 3-D images.  COMPARISON: There are no correlative imaging studies for comparison.  The contrast bolus is satisfactory. The distal aorta is normally patent and normal in caliber. At the proximal right common iliac artery there is decreased vessel caliber lower segment measuring approximately 4 7 m in length, with noncalcified plaque or mural thrombus within the vessel posteriorly. This causes about 50% stenosis. There is normal patency and caliber of the common femoral artery, superficial femoral and profunda femoral arteries as well as the popliteal artery and trifurcation vessels. Below the calf, there is poor visualization of runoff vessels, in the axial plane with motion artifact.  Reconstructed images demonstrate patency of the posterior tibial artery to a level at least beyond the ankle. The dorsalis pedis artery appears patent. There is poor visualization of the anterior tibial artery beyond the distal calf.      Impression: 1. Irregular narrowing of the proximal right common iliac artery over a segment about 4 cm in length, likely related to noncalcified plaque and/or intraluminal mural thrombus. This could be a source of emboli. Maximum stenosis at that level is approximately 50%. Vascular surgery consult is suggested. 2. Normal patency of the right common femoral artery, right superficial femoral and profunda arteries, popliteal artery and trifurcation vessels, with decreased visualization of the distal runoff vessels as described above. This report was finalized on 06/27/2020 15:43 by Dr. Jack Nix MD.    Ir Angiogram Extremity    Result Date: 6/29/2020  Narrative: Done in surgery.  Refer to patient's medical record. This report was finalized on  by Dr. Rj Rios MD.    Fl C Arm During Surgery    Result Date: 6/29/2020  Narrative: Done in surgery.  Refer to patient's medical record. This report was finalized on  by Dr. Rj Rios MD.      LABORATORY/CULTURE RESULTS:  Results from last 7 days   Lab Units 07/01/20  0528 06/30/20  0550 06/29/20  1555   WBC 10*3/mm3 5.66 5.84 4.83   HEMOGLOBIN g/dL 12.4* 12.5* 14.1   HEMATOCRIT % 34.5* 36.0* 39.5   PLATELETS 10*3/mm3 197 226 242     Results from last 7 days   Lab Units 07/01/20  0528 06/30/20  0550 06/29/20  1556  06/27/20  1332   SODIUM mmol/L 140 140 139   < > 139   POTASSIUM mmol/L 4.0 3.7 4.9   < > 4.5   CHLORIDE mmol/L 105 104 104   < > 100   CO2 mmol/L 25.0 25.0 24.0   < > 27.0   BUN mg/dL 7 9 10   < > 15   CREATININE mg/dL 0.83 0.97 1.13   < > 1.08   CALCIUM mg/dL 8.9 8.6 9.0   < > 9.6   BILIRUBIN mg/dL  --   --   --   --  0.7   ALK PHOS U/L  --   --   --   --  63   ALT (SGPT) U/L  --   --   --   --  70*   AST (SGOT)  U/L  --   --   --   --  48*   GLUCOSE mg/dL 122* 125* 110*   < > 86    < > = values in this interval not displayed.     Results from last 7 days   Lab Units 07/01/20  0528 07/01/20  0000 06/30/20  1857  06/27/20  1332   INR   --   --   --   --  1.06   APTT seconds 69.7* 81.3* 57.7*   < > 30.3    < > = values in this interval not displayed.     Microbiology Results (last 10 days)     Procedure Component Value - Date/Time    COVID PRE-OP / PRE-PROCEDURE SCREENING ORDER (NO ISOLATION) - Swab, Nasopharynx [[id number]] Collected:  06/27/20 1812    Lab Status:  Final result Specimen:  Swab from Nasopharynx Updated:  06/27/20 1948    Narrative:       The following orders were created for panel order COVID PRE-OP / PRE-PROCEDURE SCREENING ORDER (NO ISOLATION) - Swab, Nasopharynx.  Procedure                               Abnormality         Status                     ---------                               -----------         ------                     COVID-19,CEPHEID,COR/BERTRAND...[[id number]]  Normal              Final result                 Please view results for these tests on the individual orders.    COVID-19,CEPHEID,COR/BERTRAND/PAD IN-HOUSE(OR EMERGENT/ADD-ON),NP SWAB IN TRANSPORT MEDIA 3-4 HR TAT - Swab, Nasopharynx [[id number]]  (Normal) Collected:  06/27/20 1812    Lab Status:  Final result Specimen:  Swab from Nasopharynx Updated:  06/27/20 1948     COVID19 Not Detected    Narrative:       Fact sheet for providers: https://www.fda.gov/media/891309/download     Fact sheet for patients: https://www.fda.gov/media/400484/download          PATHOLOGY RESULTS:       ASSESSMENT/PLAN   1. Right foot ischemia  2. Foot pain, right  3. PVD    Comprehensive lower extremity examination and evaluation was performed.  Discussed findings and treatment plan including risks, benefits, and treatment options with patient in detail. Patient agreed with treatment plan.  Will remain conservative at this point from podiatry standpoint as there is  no evidence of full tissue necrosis of the foot/toes and final outcomes remains variable.  Improved appearance of the foot today compared to yesterday.   Continue NTG topical application to DP area.   Pain control per attending.      Thank you for the consult. Podiatry team will continue to follow patient throughout course of hospital stay.         This document has been electronically signed by KRYSTYNA Mckeon on July 1, 2020 10:33

## 2020-07-01 NOTE — PLAN OF CARE
Problem: Patient Care Overview  Goal: Plan of Care Review  Outcome: Ongoing (interventions implemented as appropriate)  Flowsheets (Taken 7/1/2020 0502)  Progress: improving  Plan of Care Reviewed With: patient  Outcome Summary: medicated prn with IV and PO meds for c/o pain, voiding per urinal, R DP and PT 2+ dopplered, heparin gtt 19mL/hr, will recheck PTT @ 0600, pt up ad homero, pt stated he wasn't sleeping well, I offered to call the  for something to help him sleep, pt refused, pt apears to be sleeping well now, will cont to monitor.

## 2020-07-01 NOTE — PLAN OF CARE
Problem: Patient Care Overview  Goal: Plan of Care Review  Outcome: Ongoing (interventions implemented as appropriate)  Flowsheets (Taken 7/1/2020 1601)  Progress: improving  Plan of Care Reviewed With: patient  Outcome Summary: Pt c/o pain PRN PO/IV pain medication given see MAR. Voiding, up ad homero, IID, regular diet, pt switched from heparin drip to xarelto. Nitroglycerin patch located to right foot. VSS, cont to monitor.

## 2020-07-02 ENCOUNTER — TELEPHONE (OUTPATIENT)
Dept: VASCULAR SURGERY | Facility: CLINIC | Age: 30
End: 2020-07-02

## 2020-07-02 LAB
ANION GAP SERPL CALCULATED.3IONS-SCNC: 12 MMOL/L (ref 5–15)
BASOPHILS # BLD AUTO: 0.02 10*3/MM3 (ref 0–0.2)
BASOPHILS NFR BLD AUTO: 0.3 % (ref 0–1.5)
BUN SERPL-MCNC: 7 MG/DL (ref 6–20)
BUN/CREAT SERPL: 6.9 (ref 7–25)
CALCIUM SPEC-SCNC: 9.5 MG/DL (ref 8.6–10.5)
CHLORIDE SERPL-SCNC: 99 MMOL/L (ref 98–107)
CO2 SERPL-SCNC: 29 MMOL/L (ref 22–29)
CREAT SERPL-MCNC: 1.01 MG/DL (ref 0.76–1.27)
DEPRECATED RDW RBC AUTO: 40.7 FL (ref 37–54)
EOSINOPHIL # BLD AUTO: 0.12 10*3/MM3 (ref 0–0.4)
EOSINOPHIL NFR BLD AUTO: 1.7 % (ref 0.3–6.2)
ERYTHROCYTE [DISTWIDTH] IN BLOOD BY AUTOMATED COUNT: 12.6 % (ref 12.3–15.4)
GFR SERPL CREATININE-BSD FRML MDRD: 86 ML/MIN/1.73
GLUCOSE SERPL-MCNC: 105 MG/DL (ref 65–99)
HCT VFR BLD AUTO: 37.9 % (ref 37.5–51)
HGB BLD-MCNC: 13.4 G/DL (ref 13–17.7)
IMM GRANULOCYTES # BLD AUTO: 0.04 10*3/MM3 (ref 0–0.05)
IMM GRANULOCYTES NFR BLD AUTO: 0.6 % (ref 0–0.5)
LYMPHOCYTES # BLD AUTO: 0.82 10*3/MM3 (ref 0.7–3.1)
LYMPHOCYTES NFR BLD AUTO: 11.8 % (ref 19.6–45.3)
MCH RBC QN AUTO: 31.5 PG (ref 26.6–33)
MCHC RBC AUTO-ENTMCNC: 35.4 G/DL (ref 31.5–35.7)
MCV RBC AUTO: 89.2 FL (ref 79–97)
MONOCYTES # BLD AUTO: 0.72 10*3/MM3 (ref 0.1–0.9)
MONOCYTES NFR BLD AUTO: 10.4 % (ref 5–12)
NEUTROPHILS NFR BLD AUTO: 5.21 10*3/MM3 (ref 1.7–7)
NEUTROPHILS NFR BLD AUTO: 75.2 % (ref 42.7–76)
NRBC BLD AUTO-RTO: 0 /100 WBC (ref 0–0.2)
PLATELET # BLD AUTO: 245 10*3/MM3 (ref 140–450)
PMV BLD AUTO: 9.4 FL (ref 6–12)
POTASSIUM SERPL-SCNC: 4 MMOL/L (ref 3.5–5.2)
RBC # BLD AUTO: 4.25 10*6/MM3 (ref 4.14–5.8)
SODIUM SERPL-SCNC: 140 MMOL/L (ref 136–145)
WBC # BLD AUTO: 6.93 10*3/MM3 (ref 3.4–10.8)

## 2020-07-02 PROCEDURE — 85025 COMPLETE CBC W/AUTO DIFF WBC: CPT | Performed by: SURGERY

## 2020-07-02 PROCEDURE — 99231 SBSQ HOSP IP/OBS SF/LOW 25: CPT | Performed by: NURSE PRACTITIONER

## 2020-07-02 PROCEDURE — 80048 BASIC METABOLIC PNL TOTAL CA: CPT | Performed by: SURGERY

## 2020-07-02 PROCEDURE — 99024 POSTOP FOLLOW-UP VISIT: CPT | Performed by: SURGERY

## 2020-07-02 PROCEDURE — 36415 COLL VENOUS BLD VENIPUNCTURE: CPT | Performed by: SURGERY

## 2020-07-02 PROCEDURE — 25010000002 HYDROMORPHONE PER 4 MG: Performed by: SURGERY

## 2020-07-02 RX ORDER — HYDROCODONE BITARTRATE AND ACETAMINOPHEN 10; 325 MG/1; MG/1
1 TABLET ORAL EVERY 4 HOURS PRN
Qty: 30 TABLET | Refills: 0 | Status: SHIPPED | OUTPATIENT
Start: 2020-07-02 | End: 2020-07-03 | Stop reason: HOSPADM

## 2020-07-02 RX ORDER — ASPIRIN 81 MG/1
81 TABLET ORAL DAILY
Qty: 30 TABLET | Refills: 3 | Status: SHIPPED | OUTPATIENT
Start: 2020-07-03 | End: 2020-10-31

## 2020-07-02 RX ORDER — NITROGLYCERIN 0.1MG/HR
1 PATCH, TRANSDERMAL 24 HOURS TRANSDERMAL DAILY
Start: 2020-07-03

## 2020-07-02 RX ORDER — OXYCODONE AND ACETAMINOPHEN 10; 325 MG/1; MG/1
1 TABLET ORAL EVERY 4 HOURS PRN
Status: DISCONTINUED | OUTPATIENT
Start: 2020-07-02 | End: 2020-07-03 | Stop reason: HOSPADM

## 2020-07-02 RX ADMIN — OXYCODONE HYDROCHLORIDE AND ACETAMINOPHEN 1 TABLET: 10; 325 TABLET ORAL at 18:18

## 2020-07-02 RX ADMIN — ASPIRIN 81 MG: 81 TABLET ORAL at 08:08

## 2020-07-02 RX ADMIN — HYDROMORPHONE HYDROCHLORIDE 0.5 MG: 1 INJECTION, SOLUTION INTRAMUSCULAR; INTRAVENOUS; SUBCUTANEOUS at 02:14

## 2020-07-02 RX ADMIN — OXYCODONE HYDROCHLORIDE AND ACETAMINOPHEN 1 TABLET: 10; 325 TABLET ORAL at 22:51

## 2020-07-02 RX ADMIN — HYDROMORPHONE HYDROCHLORIDE 0.5 MG: 1 INJECTION, SOLUTION INTRAMUSCULAR; INTRAVENOUS; SUBCUTANEOUS at 10:25

## 2020-07-02 RX ADMIN — HYDROCODONE BITARTRATE AND ACETAMINOPHEN 1 TABLET: 10; 325 TABLET ORAL at 16:18

## 2020-07-02 RX ADMIN — SODIUM CHLORIDE, PRESERVATIVE FREE 10 ML: 5 INJECTION INTRAVENOUS at 21:00

## 2020-07-02 RX ADMIN — GABAPENTIN 300 MG: 300 CAPSULE ORAL at 20:59

## 2020-07-02 RX ADMIN — GABAPENTIN 300 MG: 300 CAPSULE ORAL at 08:08

## 2020-07-02 RX ADMIN — SODIUM CHLORIDE, PRESERVATIVE FREE 10 ML: 5 INJECTION INTRAVENOUS at 08:09

## 2020-07-02 RX ADMIN — HYDROMORPHONE HYDROCHLORIDE 0.5 MG: 1 INJECTION, SOLUTION INTRAMUSCULAR; INTRAVENOUS; SUBCUTANEOUS at 17:25

## 2020-07-02 RX ADMIN — HYDROCODONE BITARTRATE AND ACETAMINOPHEN 1 TABLET: 10; 325 TABLET ORAL at 00:04

## 2020-07-02 RX ADMIN — RIVAROXABAN 20 MG: 20 TABLET, FILM COATED ORAL at 08:08

## 2020-07-02 RX ADMIN — HYDROCODONE BITARTRATE AND ACETAMINOPHEN 1 TABLET: 10; 325 TABLET ORAL at 12:32

## 2020-07-02 RX ADMIN — NITROGLYCERIN 1 PATCH: 0.1 PATCH TRANSDERMAL at 08:08

## 2020-07-02 RX ADMIN — GABAPENTIN 300 MG: 300 CAPSULE ORAL at 16:18

## 2020-07-02 RX ADMIN — HYDROCODONE BITARTRATE AND ACETAMINOPHEN 1 TABLET: 10; 325 TABLET ORAL at 08:08

## 2020-07-02 RX ADMIN — HYDROMORPHONE HYDROCHLORIDE 0.5 MG: 1 INJECTION, SOLUTION INTRAMUSCULAR; INTRAVENOUS; SUBCUTANEOUS at 05:20

## 2020-07-02 NOTE — PLAN OF CARE
Problem: Patient Care Overview  Goal: Plan of Care Review  Outcome: Ongoing (interventions implemented as appropriate)  Flowsheets  Taken 7/2/2020 1427 by Jeniffer Franks RN  Progress: improving  Taken 7/2/2020 0242 by Alanna Rodarte RN  Plan of Care Reviewed With: patient  Note:   Pt is to be discharged this afternoon.

## 2020-07-02 NOTE — PAYOR COMM NOTE
"7/2/20 [facility] [phone]  [phone]    FAXING UPDATE CLINICAL              Binh Young (30 y.o. Male)     Date of Birth Social Security Number Address Home Phone MRN    1990  [address] [address] [phone] [id number]    Bahai Marital Status          Scientologist Single       Admission Date Admission Type Admitting Provider Attending Provider Department, Room/Bed    6/27/20 Emergency Rj Rios MD Cumbers, Jason Ronald, MD [facility] 3C, 387/1    Discharge Date Discharge Disposition Discharge Destination         Home or Self Care              Attending Provider:  Rj Rios MD    Allergies:  No Known Allergies    Isolation:  None   Infection:  None   Code Status:  CPR    Ht:  185.4 cm (72.99\")   Wt:  133 kg (293 lb 3.4 oz)    Admission Cmt:  None   Principal Problem:  None                Active Insurance as of 6/27/2020     Primary Coverage     Payor Plan Insurance Group Employer/Plan Group    [employer] [facility] [employer] [id number]     Payor Plan Address Payor Plan Phone Number Payor Plan Fax Number Effective Dates    [address] [phone]  4/1/2020 - None Entered    [place] [address]       Subscriber Name Subscriber Birth Date Member ID       BINH YOUNG 1990 [id number]                 Emergency Contacts      (Rel.) Home Phone Work Phone Mobile Phone    Jennifer Castillo (Significant Other) -- -- [phone]        Anthony Jimenez APRN   Nurse Practitioner   Podiatry   Progress Notes   Signed   Date of Service:  07/02/20 1214   Creation Time:  07/02/20 1214            Signed        Expand All Collapse All      Show:Clear all  [x]Manual[x]Template[x]Copied    Added by:  [x]Anthony Jimenez APRN    []Magdalena for details       [facility] - PODIATRY     Today's Date: 07/02/20     Patient Name: Binh Young  MRN: [id number]  CSN: " [id number]  PCP: Alee Judge PA  Referring Provider: No ref. provider found  Attending Provider: Rj Rios MD  Length of Stay: 5     SUBJECTIVE   Chief Complaint: Right foot pain      HPI: Binh Bowen, a 30 y.o.male, followed for ischemic changes to right foot and toes. No overnight events. Patient reports worsened pain in foot today with shooting/burning sensations. Feels that toes and foot are cold. Notes that nitro patch seems to make surrounding tissues more sensitive. Labs stable. Possible discharge today. Patient notes that he has not been able to put any weight on forefoot due to pain. Denies any constitutional symptoms. No other pedal complaints at this time.              History   History reviewed. No pertinent past medical history.     Surgical History     Review of Systems   Constitutional: Negative for chills and fever.   HENT: Negative for congestion.    Respiratory: Negative for shortness of breath.    Cardiovascular: Positive for leg swelling. Negative for chest pain.   Gastrointestinal: Negative for constipation, diarrhea, nausea and vomiting.   Musculoskeletal: Positive for myalgias. Negative for arthralgias.        Right foot pain   Skin: Positive for color change. Negative for wound.   Neurological: Positive for numbness.      Vitals:     20 0715   BP: 124/57   Pulse: 68   Resp: 18   Temp: 98.4 °F (36.9 °C)   SpO2: 98%         PHYSICAL EXAM  GEN:   A&Ox3, NAD. Pt presents in hospital bed. Accompanied by none.     /Ankle Exam:       General:   Appearance: appears stated age and healthy    Orientation: AAOx3    Affect: appropriate    Gait: antalgic    Assistance: walker and independent    Shoe Gear:  Socks   VASCULAR       Right Foot Vascularity   Dorsalis pedis:  Doppler   Posterior tibial:  Doppler   Skin Temperature: warm to cool distally    Edema Gradin+ and non-pitting  CFT:  4  Pedal Hair Growth:  Present  Varicosities: none      Left Foot Vascularity   Dorsalis  pedis:  2+  Posterior tibial:  2+  Skin Temperature: warm    Edema Grading:  None  CFT:  3  Pedal Hair Growth:  Present  Varicosities: none        NEUROLOGIC  Foot Neurologic   Normal sensation    Light touch sensation:  Normal  Vibratory sensation:  Normal  Hot/Cold sensation: normal        Left Foot Neurologic   Normal sensation    Light touch sensation:  Normal  Vibratory sensation:  Normal  Hot/cold sensation: normal   MUSCULOSKELETAL       Right Foot Musculoskeletal    Amputation   Right toes amputated: No    Ecchymosis:  None  Tenderness comment:  Diffuse tenderness to distal 1/3 of foot of both plantar and dorsal surfaces  Arch:  Normal    Left Foot Musculoskeletal    Amputation   Left toes amputated: No    Ecchymosis:  None  Tenderness: none    Arch:  NormalRANGE OF MOTION       Right Foot Range of Motion   Foot and ankle ROM within normal limits        Left Foot Range of Motion   Foot and ankle ROM within normal limits        DERMATOLOGIC      Right Foot Dermatologic   Skin: color abnormal and skin changes    Skin comment:  Hyperemic appearance of distal 2/3 of foot. Color continues to improve today to distal 1/3 foot and toes with improved cap refill  Nails: normal        Left Foot Dermatologic   Skin: skin intact    Nails: normal        LABORATORY/CULTURE RESULTS:         Results from last 7 days   Lab Units 07/02/20  0530 07/01/20  0528 06/30/20  0550   WBC 10*3/mm3 6.93 5.66 5.84   HEMOGLOBIN g/dL 13.4 12.4* 12.5*   HEMATOCRIT % 37.9 34.5* 36.0*   PLATELETS 10*3/mm3 245 197 226               Results from last 7 days   Lab Units 07/02/20  0530 07/01/20  0528 06/30/20  0550   06/27/20  1332   SODIUM mmol/L 140 140 140   < > 139   POTASSIUM mmol/L 4.0 4.0 3.7   < > 4.5   CHLORIDE mmol/L 99 105 104   < > 100   CO2 mmol/L 29.0 25.0 25.0   < > 27.0   BUN mg/dL 7 7 9   < > 15   CREATININE mg/dL 1.01 0.83 0.97   < > 1.08   CALCIUM mg/dL 9.5 8.9 8.6   < > 9.6   BILIRUBIN mg/dL  --   --   --   --  0.7   ALK PHOS  U/L  --   --   --   --  63   ALT (SGPT) U/L  --   --   --   --  70*   AST (SGOT) U/L  --   --   --   --  48*   GLUCOSE mg/dL 105* 122* 125*   < > 86    < > = values in this interval not displayed     ASSESSMENT/PLAN   1. Right foot ischemia  2. Foot pain, right  3. PVD     Comprehensive lower extremity examination and evaluation was performed.  Discussed findings and treatment plan including risks, benefits, and treatment options with patient in detail. Patient agreed with treatment plan.  Advised that from podiatry standpoint that we would continue to be conservative and monitor area for ongoing changes including demarcation of tissues, necrosis of toes or forefoot, or open areas. Did discuss need for possible future surgical intervention if necessary including up to amputation of toes, TMA, or BKA.  Color, cap refill, and temperature continue to improve today.  Continue NTG topical application to DP area.   Continue vascular surgery follow-ups.   Okay from podiatry standpoint for patient to discharge. No outpatient appointment needed with podiatry but patient instructed to contact our office with any needs or changes.             This document has been electronically signed by KRYSTYNA Mckeon on July 2, 2020 13:11      appropriate)  Flowsheets (Taken 7/2/2020 0242)  Progress: no change  Plan of Care Reviewed With: patient  Outcome Summary: Pulses to right leg 2+ with doppler.  Edema noted to leg, ankle and foot.  Drsgs to andreia groins g/t, c/d/i.  Medicated with PO and IV pain meds frequently.  Voiding.  IID,  Up ad homero.  Cont to monitor.            Current Facility-Administered Medications   Medication Dose Route Frequency Provider Last Rate Last Dose   • aspirin EC tablet 81 mg  81 mg Oral Daily Rj Rios MD   81 mg at 07/02/20 0808   • gabapentin (NEURONTIN) capsule 300 mg  300 mg Oral TID Rj Rios MD   300 mg at 07/02/20 0808   • HYDROcodone-acetaminophen (NORCO)  MG per  tablet 1 tablet  1 tablet Oral Q4H PRN Rj Rios MD   1 tablet at 07/02/20 1232   • HYDROmorphone (DILAUDID) injection 0.5 mg  0.5 mg Intravenous Q2H PRN Rj Rios MD   0.5 mg at 07/02/20 1025    And   • naloxone (NARCAN) injection 0.4 mg  0.4 mg Intravenous Q5 Min PRN Rj Rios MD       • nitroglycerin (NITRODUR) 0.1 MG/HR patch 1 patch  1 patch Transdermal Daily Rj Rios MD   1 patch at 07/02/20 0808   • ondansetron (ZOFRAN) tablet 4 mg  4 mg Oral Q6H PRN Rj Rios MD        Or   • ondansetron (ZOFRAN) injection 4 mg  4 mg Intravenous Q6H PRN Rj Rios MD       • rivaroxaban (XARELTO) tablet 20 mg  20 mg Oral Daily Rj Rios MD   20 mg at 07/02/20 0808   • sodium chloride 0.9 % flush 10 mL  10 mL Intravenous Q12H Rj Rios MD   10 mL at 07/02/20 0809   • sodium chloride 0.9 % flush 10 mL  10 mL Intravenous PRN Rj Rios MD

## 2020-07-02 NOTE — DISCHARGE SUMMARY
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 male presented with right foot and lower leg pain.  He presented here to our ER for a second opinion.  He noted that he had been having what sounds like claudication symptoms to the right lower extremity since back in February or March of this year however since a week or 2 before [facility] Day he had increasing pain to the right foot and distal lower leg with some swelling and erythema.  He ultimately presented to [facility] back at the beginning of June on 6/10 with further findings of right foot pain and concern for ischemia.  Ultimately he was seen by a cardiologist there.  CTA of the abdominal aorta with runoff was performed and report of that study showed almost complete occlusion of the right common iliac artery with thrombus.  There was also extension of thrombus and complete occlusion of the right internal iliac artery and some thrombus extending into the proximal external iliac artery.  There was patency of the common femoral and profunda and SFA as well as the popliteal arteries however there was basically no flow in the tibials below the proximal/mid calf level with almost no outflow into the foot.  Ultimately was taken to the Cath Lab suite in [place] and underwent angiogram with placement of an EKOS catheter in the iliac artery and thrombolysis with TPA infusion was performed.  He was taken back to the Cath Lab the following day and as per op report had resolution of the thrombus in the right iliac artery but continued to have occlusion of the tibials with really no outflow into the foot.  And EKOS catheter was repositioned and placed into the posterior tibial artery and further thrombolysis with  TPA infusion was performed.  The patient was then subsequently taken back for a third procedure with angiogram and had attempted angioplasty of the anterior tibial and posterior tibial arteries with little improvement.  Ultimately no further intervention was performed and he was transitioned onto Xarelto.  He was recommended to continue with nitro patch to the right foot and had been referred to be fit for arterial lower extremity pump to try and increase perfusion of the right lower leg.  Plan was also being made for him to be referred to [place] for further vascular surgery evaluation apparently.  However he continued to have pain and swelling to the right foot to the point where he was having significant difficulty walking and so re-presented to the ER in [place] on 6/22.  Repeat CTA with runoff was again performed and as per the report of this study there was still some thrombus present in the right iliac artery however it was improved from previous.  There was also again noted basically no flow in the tibials from the mid calf distally with no outflow into the foot.  The patient did apparently have a PT Doppler signal and so no further intervention was deemed necessary and he was discharged home the following day.  However again his pain continued and so he presented here to our ER on 6/27.  Here again he had symptoms concerning for ischemia of the right forefoot and toes.  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  Again the common femoral and profunda, as well as the SFA and popliteal arteries were widely patent.  The trifurcation was patent but just beyond the takeoff of the anterior tibial, posterior tibial, and peroneal there appeared to be occlusion with really no significant inflow into the foot.   Clinically he had dusky ischemic changes of the distal forefoot and toes but did have motor and sensation that was still present.  Clinically he had a very weak PT Doppler signal in a week Doppler signal at the distal AT at the ankle but they were monophasic and very poor.  Ultimately he was admitted here and started on heparin drip and I took him to the operating room on 6/29.  There he had angiogram initially via a left groin approach which again showed the presence of residual thrombus within the right common iliac artery.  Right groin access was obtained and a covered stent was placed across that area to exclude the thrombus in order to prevent any possible further embolization.  Right lower extremity angiogram was then performed and a wire was able to be placed distally in the anterior tibial artery to the ankle and so it was thought that potentially some of this thrombus may be able to be directly removed via thrombectomy.  As such right groin exploration and attempted thrombectomy of the right anterior tibial and posterior tibial arteries was performed.  However only a very minimal amount of very organized, chronic thrombus was able to be removed from the tibials with no real significant improvement in outflow.  Attempts were made to pass [place] catheters all the way out into the dorsalis pedis and pedal arch but the thrombus appeared very organized and no significant thrombus could be again removed.  Unfortunately at this point it was felt that no further intervention would be helpful and so the procedure was ended.  Patient did have some improved perfusion and increased Doppler signals postoperatively with monophasic DP and PT signals present and some improvement in his right proximal foot perfusion however here continue to have some signs of chronic type ischemia to the distal forefoot and toes along with pain.  Nitropatch was placed to the foot and he was transitioned back from heparin drip to Xarelto  for continued anticoagulation.  While here work-up of possible sources of embolus from more proximal had been performed with a CTA of the chest as well as an echo which showed no proximal source of thromboembolism.  Long discussion was had with the patient regarding his ischemic changes and their chronicity and the fact that we were unable to significantly improve any perfusion to the distal lower leg and foot.  We discussed that he would likely at some point require some degree of amputation whether that be TMA or potentially even below-knee amputation this is been discussed prior to surgery.  He was seen by podiatry during this admission and plan is to further observe his distal forefoot for demarcation and then plan for further operative intervention with likely some degree of amputation in the future.  At this point the patient does have DP and PT Doppler signals in his lower extremities warm to the level of the midfoot.  He will be discharged on Xarelto and aspirin and also will be applying a nitro patch to the right foot daily.  He will also have a prescription for pain medication and I will plan to see him back in the office in a week.  I did provide him with a copy of his operative note from my procedure here as well as a copy of his CTA of the right lower extremity and angiogram images as he wishes to obtain a second opinion likely at [facility] in [place] before consenting to undergo any type of amputation of his right lower extremity.  Otherwise he is stable for discharge at this time.  All questions were adequately answered and discharge instructions were given.    Procedures Performed  Procedure(s):  AORTAGRAM, RIGHT LOWER EXTREMITY ANGIOGRAM, RIGHT ILIAC STENT PLACEMENT, BALLOON ANGIOPLASTY, RIGHT LOWER EXTREMITY THROMBECTOMY       Consults:   Consults     Date and Time Order Name Status Description    6/30/2020 1010 Inpatient Podiatry Consult Completed             Condition on Discharge:  Stable    Discharge Medications     Discharge Medications      New Medications      Instructions Start Date   aspirin 81 MG EC tablet   81 mg, Oral, Daily      nitroglycerin 0.1 MG/HR patch  Commonly known as:  NITRODUR   1 patch, Transdermal, Daily      oxyCODONE-acetaminophen  MG per tablet  Commonly known as:  PERCOCET   1 tablet, Oral, Every 4 Hours PRN         Continue These Medications      Instructions Start Date   gabapentin 300 MG capsule  Commonly known as:  NEURONTIN   300 mg, Oral, 3 Times Daily      NIFEdipine XL 30 MG 24 hr tablet  Commonly known as:  PROCARDIA XL   30 mg, Oral, Daily      rivaroxaban 20 MG tablet  Commonly known as:  XARELTO   20 mg, Oral, Daily With Dinner         Stop These Medications    HYDROcodone-acetaminophen 7.5-325 MG per tablet  Commonly known as:  NORCO            Discharge Diet:   Diet Instructions     Advance Diet As Tolerated      Diet: Regular      Discharge Diet:  Regular          Activity at Discharge:   Activity Instructions     Activity as Tolerated            Follow-up Appointments  Future Appointments   Date Time Provider Department Center   7/10/2020 11:00 AM Rj Rios MD [facility] None     Additional Instructions for the Follow-ups that You Need to Schedule     Discharge Follow-up with Specified Provider: Vascular-Dr. Rios; 1 Week   As directed      To:  Vascular-Dr. Rios    Follow Up:  1 Week    Follow Up Details:  Follow up on 7/10/2020                Rj Rios MD  07/03/20  08:10

## 2020-07-02 NOTE — PLAN OF CARE
Problem: Patient Care Overview  Goal: Plan of Care Review  Outcome: Ongoing (interventions implemented as appropriate)  Flowsheets (Taken 7/2/2020 0242)  Progress: no change  Plan of Care Reviewed With: patient  Outcome Summary: Pulses to right leg 2+ with doppler.  Edema noted to leg, ankle and foot.  Drsgs to andreia groins g/t, c/d/i.  Medicated with PO and IV pain meds frequently.  Voiding.  IID,  Up ad homero.  Cont to monitor.

## 2020-07-02 NOTE — PROGRESS NOTES
Discharge Planning Assessment  [facility]     Patient Name: Binh Bowen  MRN: [id number]  Today's Date: 7/2/2020    Admit Date: 6/27/2020    Discharge Needs Assessment     Row Name 07/02/20 1107       Living Environment    Lives With  alone  (Pended)     Current Living Arrangements  home/apartment/condo  (Pended)     Primary Care Provided by  self  (Pended)     Provides Primary Care For  no one  (Pended)     Quality of Family Relationships  helpful;involved;supportive  (Pended)        Resource/Environmental Concerns    Resource/Environmental Concerns  none  (Pended)     Transportation Concerns  car, none  (Pended)        Transition Planning    Patient/Family Anticipates Transition to  home  (Pended)     Patient/Family Anticipated Services at Transition  none  (Pended)     Transportation Anticipated  family or friend will provide  (Pended)        Discharge Needs Assessment    Readmission Within the Last 30 Days  no previous admission in last 30 days  (Pended)     Concerns to be Addressed  discharge planning  (Pended)     Equipment Currently Used at Home  none  (Pended)     Equipment Needed After Discharge  none  (Pended)     Discharge Coordination/Progress  Pt has rx coverage and a PCP. Pt lived at home alone prior to admission and plans to d/c today, 7/2/20. Pt denies any DME or service needs at this time. SW will follow and assist w/any d/c needs that may arise.   (Pended)         Discharge Plan    No documentation.       Destination      Coordination has not been started for this encounter.      Durable Medical Equipment      Coordination has not been started for this encounter.      Dialysis/Infusion      Coordination has not been started for this encounter.      Home Medical Care      Coordination has not been started for this encounter.      Therapy      Coordination has not been started for this encounter.      Community Resources      Coordination has not been started for this encounter.          Demographic  Summary    No documentation.       Functional Status    No documentation.       Psychosocial    No documentation.       Abuse/Neglect    No documentation.       Legal    No documentation.       Substance Abuse    No documentation.       Patient Forms    No documentation.           [facility]

## 2020-07-02 NOTE — PROGRESS NOTES
[facility] - PODIATRY    Today's Date: 07/02/20    Patient Name: Binh Bowen  MRN: [id number]  CSN: [id number]  PCP: Alee Judge PA  Referring Provider: No ref. provider found  Attending Provider: Rj Rios MD  Length of Stay: 5    SUBJECTIVE   Chief Complaint: Right foot pain     HPI: Binh Bowen, a 30 y.o.male, followed for ischemic changes to right foot and toes. No overnight events. Patient reports worsened pain in foot today with shooting/burning sensations. Feels that toes and foot are cold. Notes that nitro patch seems to make surrounding tissues more sensitive. Labs stable. Possible discharge today. Patient notes that he has not been able to put any weight on forefoot due to pain. Denies any constitutional symptoms. No other pedal complaints at this time.    History reviewed. No pertinent past medical history.  Past Surgical History:   Procedure Laterality Date   • AORTAGRAM Right 6/29/2020    Procedure: AORTAGRAM, RIGHT LOWER EXTREMITY ANGIOGRAM, RIGHT ILIAC STENT PLACEMENT, BALLOON ANGIOPLASTY, RIGHT LOWER EXTREMITY THROMBECTOMY;  Surgeon: Rj Rois MD;  Location: [facility] OR 12;  Service: Vascular;  Laterality: Right;   • VASCULAR SURGERY  06/08/2020    About two weeks ago Sheath placement throught L groin at [place].     History reviewed. No pertinent family history.  Social History     Socioeconomic History   • Marital status: Single     Spouse name: Not on file   • Number of children: Not on file   • Years of education: Not on file   • Highest education level: Not on file   Tobacco Use   • Smoking status: Never Smoker   • Smokeless tobacco: Never Used   Substance and Sexual Activity   • Alcohol use: Not Currently   • Drug use: Not Currently   • Sexual activity: Yes     Partners: Female     No Known Allergies  Current Facility-Administered Medications   Medication Dose Route Frequency Provider Last Rate Last Dose   • aspirin EC tablet 81 mg  81 mg  Oral Daily Rj Rios MD   81 mg at 07/02/20 0808   • gabapentin (NEURONTIN) capsule 300 mg  300 mg Oral TID Rj Rios MD   300 mg at 07/02/20 0808   • HYDROcodone-acetaminophen (NORCO)  MG per tablet 1 tablet  1 tablet Oral Q4H PRN Rj Rios MD   1 tablet at 07/02/20 1232   • HYDROmorphone (DILAUDID) injection 0.5 mg  0.5 mg Intravenous Q2H PRN Rj Rios MD   0.5 mg at 07/02/20 1025    And   • naloxone (NARCAN) injection 0.4 mg  0.4 mg Intravenous Q5 Min PRN Rj Rios MD       • nitroglycerin (NITRODUR) 0.1 MG/HR patch 1 patch  1 patch Transdermal Daily Rj Rios MD   1 patch at 07/02/20 0808   • ondansetron (ZOFRAN) tablet 4 mg  4 mg Oral Q6H PRN Rj Rios MD        Or   • ondansetron (ZOFRAN) injection 4 mg  4 mg Intravenous Q6H PRN Rj Rios MD       • rivaroxaban (XARELTO) tablet 20 mg  20 mg Oral Daily Rj Rios MD   20 mg at 07/02/20 0808   • sodium chloride 0.9 % flush 10 mL  10 mL Intravenous Q12H Rj Rios MD   10 mL at 07/02/20 0809   • sodium chloride 0.9 % flush 10 mL  10 mL Intravenous PRN Rj Rios MD         Review of Systems   Constitutional: Negative for chills and fever.   HENT: Negative for congestion.    Respiratory: Negative for shortness of breath.    Cardiovascular: Positive for leg swelling. Negative for chest pain.   Gastrointestinal: Negative for constipation, diarrhea, nausea and vomiting.   Musculoskeletal: Positive for myalgias. Negative for arthralgias.        Right foot pain   Skin: Positive for color change. Negative for wound.   Neurological: Positive for numbness.       OBJECTIVE     Vitals:    07/02/20 0715   BP: 124/57   Pulse: 68   Resp: 18   Temp: 98.4 °F (36.9 °C)   SpO2: 98%       PHYSICAL EXAM  GEN:   A&Ox3, NAD. Pt presents in hospital bed. Accompanied by none.     Foot/Ankle Exam:       General:   Appearance: appears stated age  and healthy    Orientation: AAOx3    Affect: appropriate    Gait: antalgic    Assistance: walker and independent    Shoe Gear:  Socks    VASCULAR      Right Foot Vascularity   Dorsalis pedis:  Doppler   Posterior tibial:  Doppler   Skin Temperature: warm to cool distally    Edema Gradin+ and non-pitting  CFT:  4  Pedal Hair Growth:  Present  Varicosities: none       Left Foot Vascularity   Dorsalis pedis:  2+  Posterior tibial:  2+  Skin Temperature: warm    Edema Grading:  None  CFT:  3  Pedal Hair Growth:  Present  Varicosities: none        NEUROLOGIC     Right Foot Neurologic   Normal sensation    Light touch sensation:  Normal  Vibratory sensation:  Normal  Hot/Cold sensation: normal       Left Foot Neurologic   Normal sensation    Light touch sensation:  Normal  Vibratory sensation:  Normal  Hot/cold sensation: normal       MUSCULOSKELETAL      Right Foot Musculoskeletal    Amputation   Right toes amputated: No    Ecchymosis:  None  Tenderness comment:  Diffuse tenderness to distal 1/3 of foot of both plantar and dorsal surfaces  Arch:  Normal     Left Foot Musculoskeletal    Amputation   Left toes amputated: No    Ecchymosis:  None  Tenderness: none    Arch:  Normal     MUSCLE STRENGTH     Right Foot Muscle Strength   Foot dorsiflexion:  5  Foot plantar flexion:  5  Foot inversion:  5  Foot eversion:  5     Left Foot Muscle Strength   Foot dorsiflexion:  5  Foot plantar flexion:  5  Foot inversion:  5  Foot eversion:  5     RANGE OF MOTION      Right Foot Range of Motion   Foot and ankle ROM within normal limits       Left Foot Range of Motion   Foot and ankle ROM within normal limits       DERMATOLOGIC     Right Foot Dermatologic   Skin: color abnormal and skin changes    Skin comment:  Hyperemic appearance of distal 2/3 of foot. 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 Reconstructed 3-D, coronal and sagittal images were also reviewed.  Comparison: There are no correlative imaging studies for comparison.  The contrast bolus is satisfactory. The pulmonary arteries are normal caliber, without Aquino defects. There is no evidence of right heart strain. The aorta is normal in caliber, with ectasia of the ascending aorta, which has a maximum diameter of 3.3 cm. There is bilateral gynecomastia. No intrathoracic lymphadenopathy is identified. The visualized thyroid gland is unremarkable. There are calcified subcarinal lymph nodes, small calcified left hilar lymph nodes compatible with healed granulomatous disease. No pneumothorax or pleural effusion is identified. Lung windows demonstrate respiratory motion artifact. There is mild atelectasis in the medial basal segment on the right, associated with spurring on the right side of the spine. There is no pulmonary consolidation or pneumonia. There is a small calcified granuloma in the left lower lobe. In the upper abdomen, the spleen is enlarged, with a maximum dimension of 13 x 19 cm. Review of bone windows is unremarkable.      Impression: 1. No acute intrathoracic abnormality, no evidence of pulmonary embolism. 2. Splenomegaly, the spleen measures 13 x 19 cm. The spleen is homogeneous. 3. Mild ectasia of the  ascending aorta but with a diameter of only 3.3 cm. No significant atherosclerotic disease of the aorta. 4. Evidence of healed granulomatous disease. 4. Mild bilateral gynecomastia.     This report was finalized on 06/28/2020 12:23 by Dr. Jack Nix MD.    Ct Angiogram Lower Extremity Right    Result Date: 6/27/2020  Narrative: EXAMINATION: CT ANGIOGRAM LOWER EXTREMITY RIGHT- 6/27/2020 3:31 PM CDT  HISTORY: Recently dx with Clots in [place], now blue toes, cool foot. Lower extremity arterial insufficiency.  DOSE: 871 mGycm (Automatic exposure control technique was implemented in an effort to keep the radiation dose as low as possible without compromising image quality)  REPORT: Spiral CT of the right lower extremity was performed after administration of intravenous contrast, using CTA protocol, which includes reconstructed coronal, sagittal and 3-D images.  COMPARISON: There are no correlative imaging studies for comparison.  The contrast bolus is satisfactory. The distal aorta is normally patent and normal in caliber. At the proximal right common iliac artery there is decreased vessel caliber lower segment measuring approximately 4 7 m in length, with noncalcified plaque or mural thrombus within the vessel posteriorly. This causes about 50% stenosis. There is normal patency and caliber of the common femoral artery, superficial femoral and profunda femoral arteries as well as the popliteal artery and trifurcation vessels. Below the calf, there is poor visualization of runoff vessels, in the axial plane with motion artifact. Reconstructed images demonstrate patency of the posterior tibial artery to a level at least beyond the ankle. The dorsalis pedis artery appears patent. There is poor visualization of the anterior tibial artery beyond the distal calf.      Impression: 1. Irregular narrowing of the proximal right common iliac artery over a segment about 4 cm in length, likely related to noncalcified plaque  and/or intraluminal mural thrombus. This could be a source of emboli. Maximum stenosis at that level is approximately 50%. Vascular surgery consult is suggested. 2. Normal patency of the right common femoral artery, right superficial femoral and profunda arteries, popliteal artery and trifurcation vessels, with decreased visualization of the distal runoff vessels as described above. This report was finalized on 06/27/2020 15:43 by Dr. Jack Nix MD.    Ir Angiogram Extremity    Result Date: 6/29/2020  Narrative: Done in surgery.  Refer to patient's medical record. This report was finalized on  by Dr. Rj Rios MD.    Fl C Arm During Surgery    Result Date: 6/29/2020  Narrative: Done in surgery.  Refer to patient's medical record. This report was finalized on  by Dr. Rj Rios MD.      LABORATORY/CULTURE RESULTS:  Results from last 7 days   Lab Units 07/02/20  0530 07/01/20  0528 06/30/20  0550   WBC 10*3/mm3 6.93 5.66 5.84   HEMOGLOBIN g/dL 13.4 12.4* 12.5*   HEMATOCRIT % 37.9 34.5* 36.0*   PLATELETS 10*3/mm3 245 197 226     Results from last 7 days   Lab Units 07/02/20  0530 07/01/20  0528 06/30/20  0550  06/27/20  1332   SODIUM mmol/L 140 140 140   < > 139   POTASSIUM mmol/L 4.0 4.0 3.7   < > 4.5   CHLORIDE mmol/L 99 105 104   < > 100   CO2 mmol/L 29.0 25.0 25.0   < > 27.0   BUN mg/dL 7 7 9   < > 15   CREATININE mg/dL 1.01 0.83 0.97   < > 1.08   CALCIUM mg/dL 9.5 8.9 8.6   < > 9.6   BILIRUBIN mg/dL  --   --   --   --  0.7   ALK PHOS U/L  --   --   --   --  63   ALT (SGPT) U/L  --   --   --   --  70*   AST (SGOT) U/L  --   --   --   --  48*   GLUCOSE mg/dL 105* 122* 125*   < > 86    < > = values in this interval not displayed.     Results from last 7 days   Lab Units 07/01/20  0528 07/01/20  0000 06/30/20  1857  06/27/20  1332   INR   --   --   --   --  1.06   APTT seconds 69.7* 81.3* 57.7*   < > 30.3    < > = values in this interval not displayed.     Microbiology Results (last 10 days)      Procedure Component Value - Date/Time    COVID PRE-OP / PRE-PROCEDURE SCREENING ORDER (NO ISOLATION) - Swab, Nasopharynx [[id number]] Collected:  06/27/20 1812    Lab Status:  Final result Specimen:  Swab from Nasopharynx Updated:  06/27/20 1948    Narrative:       The following orders were created for panel order COVID PRE-OP / PRE-PROCEDURE SCREENING ORDER (NO ISOLATION) - Swab, Nasopharynx.  Procedure                               Abnormality         Status                     ---------                               -----------         ------                     COVID-19,CEPHEID,COR/BERTRAND...[[id number]]  Normal              Final result                 Please view results for these tests on the individual orders.    COVID-19,CEPHEID,COR/BERTRAND/PAD IN-HOUSE(OR EMERGENT/ADD-ON),NP SWAB IN TRANSPORT MEDIA 3-4 HR TAT - Swab, Nasopharynx [[id number]]  (Normal) Collected:  06/27/20 1812    Lab Status:  Final result Specimen:  Swab from Nasopharynx Updated:  06/27/20 1948     COVID19 Not Detected    Narrative:       Fact sheet for providers: https://www.fda.gov/media/183001/download     Fact sheet for patients: https://www.fda.gov/media/258171/download          PATHOLOGY RESULTS:       ASSESSMENT/PLAN   1. Right foot ischemia  2. Foot pain, right  3. PVD    Comprehensive lower extremity examination and evaluation was performed.  Discussed findings and treatment plan including risks, benefits, and treatment options with patient in detail. Patient agreed with treatment plan.  Advised that from podiatry standpoint that we would continue to be conservative and monitor area for ongoing changes including demarcation of tissues, necrosis of toes or forefoot, or open areas. Did discuss need for possible future surgical intervention if necessary including up to amputation of toes, TMA, or BKA.  Color, cap refill, and temperature continue to improve today.  Continue NTG topical application to DP area.   Continue vascular surgery  follow-ups.   Okay from podiatry standpoint for patient to discharge. No outpatient appointment needed with podiatry but patient instructed to contact our office with any needs or changes.          This document has been electronically signed by KRYSTYNA Mckeon on July 2, 2020 13:11

## 2020-07-02 NOTE — TELEPHONE ENCOUNTER
Jeniffer called from [facility] and said that Dr. Rios had discharged this patient with nitro patches.  She said that he didn't print the script or send it to the pharmacy.  She wanted to know what they should do.    I spoke to Dr. Rios and he said that he just put it on his med list so that it would be complete.  The patient didn't need a new RX because he still had some at home.  I gave this info to Jeniffer.

## 2020-07-03 VITALS
OXYGEN SATURATION: 97 % | HEART RATE: 73 BPM | SYSTOLIC BLOOD PRESSURE: 101 MMHG | RESPIRATION RATE: 18 BRPM | BODY MASS INDEX: 38.86 KG/M2 | DIASTOLIC BLOOD PRESSURE: 48 MMHG | HEIGHT: 73 IN | WEIGHT: 293.21 LBS | TEMPERATURE: 98.4 F

## 2020-07-03 LAB
ANION GAP SERPL CALCULATED.3IONS-SCNC: 14 MMOL/L (ref 5–15)
BASOPHILS # BLD AUTO: 0.02 10*3/MM3 (ref 0–0.2)
BASOPHILS NFR BLD AUTO: 0.4 % (ref 0–1.5)
BUN SERPL-MCNC: 11 MG/DL (ref 6–20)
BUN/CREAT SERPL: 10.3 (ref 7–25)
CALCIUM SPEC-SCNC: 9 MG/DL (ref 8.6–10.5)
CHLORIDE SERPL-SCNC: 98 MMOL/L (ref 98–107)
CO2 SERPL-SCNC: 25 MMOL/L (ref 22–29)
CREAT SERPL-MCNC: 1.07 MG/DL (ref 0.76–1.27)
CYTO UR: NORMAL
DEPRECATED RDW RBC AUTO: 39.7 FL (ref 37–54)
EOSINOPHIL # BLD AUTO: 0.06 10*3/MM3 (ref 0–0.4)
EOSINOPHIL NFR BLD AUTO: 1.2 % (ref 0.3–6.2)
ERYTHROCYTE [DISTWIDTH] IN BLOOD BY AUTOMATED COUNT: 12.4 % (ref 12.3–15.4)
GFR SERPL CREATININE-BSD FRML MDRD: 80 ML/MIN/1.73
GLUCOSE SERPL-MCNC: 114 MG/DL (ref 65–99)
HCT VFR BLD AUTO: 35.8 % (ref 37.5–51)
HGB BLD-MCNC: 12.9 G/DL (ref 13–17.7)
IMM GRANULOCYTES # BLD AUTO: 0.03 10*3/MM3 (ref 0–0.05)
IMM GRANULOCYTES NFR BLD AUTO: 0.6 % (ref 0–0.5)
LAB AP CASE REPORT: NORMAL
LYMPHOCYTES # BLD AUTO: 0.5 10*3/MM3 (ref 0.7–3.1)
LYMPHOCYTES NFR BLD AUTO: 9.8 % (ref 19.6–45.3)
MCH RBC QN AUTO: 31.7 PG (ref 26.6–33)
MCHC RBC AUTO-ENTMCNC: 36 G/DL (ref 31.5–35.7)
MCV RBC AUTO: 88 FL (ref 79–97)
MONOCYTES # BLD AUTO: 0.66 10*3/MM3 (ref 0.1–0.9)
MONOCYTES NFR BLD AUTO: 13 % (ref 5–12)
NEUTROPHILS NFR BLD AUTO: 3.81 10*3/MM3 (ref 1.7–7)
NEUTROPHILS NFR BLD AUTO: 75 % (ref 42.7–76)
NRBC BLD AUTO-RTO: 0 /100 WBC (ref 0–0.2)
PATH REPORT.FINAL DX SPEC: NORMAL
PATH REPORT.GROSS SPEC: NORMAL
PLATELET # BLD AUTO: 220 10*3/MM3 (ref 140–450)
PMV BLD AUTO: 9.8 FL (ref 6–12)
POTASSIUM SERPL-SCNC: 3.5 MMOL/L (ref 3.5–5.2)
RBC # BLD AUTO: 4.07 10*6/MM3 (ref 4.14–5.8)
SODIUM SERPL-SCNC: 137 MMOL/L (ref 136–145)
WBC # BLD AUTO: 5.08 10*3/MM3 (ref 3.4–10.8)

## 2020-07-03 PROCEDURE — 99024 POSTOP FOLLOW-UP VISIT: CPT | Performed by: SURGERY

## 2020-07-03 PROCEDURE — 80048 BASIC METABOLIC PNL TOTAL CA: CPT | Performed by: SURGERY

## 2020-07-03 PROCEDURE — 85025 COMPLETE CBC W/AUTO DIFF WBC: CPT | Performed by: SURGERY

## 2020-07-03 RX ORDER — OXYCODONE AND ACETAMINOPHEN 10; 325 MG/1; MG/1
1 TABLET ORAL EVERY 4 HOURS PRN
Qty: 30 TABLET | Refills: 0 | Status: SHIPPED | OUTPATIENT
Start: 2020-07-03 | End: 2020-07-12

## 2020-07-03 RX ADMIN — NITROGLYCERIN 1 PATCH: 0.1 PATCH TRANSDERMAL at 08:48

## 2020-07-03 RX ADMIN — OXYCODONE HYDROCHLORIDE AND ACETAMINOPHEN 1 TABLET: 10; 325 TABLET ORAL at 02:54

## 2020-07-03 RX ADMIN — ASPIRIN 81 MG: 81 TABLET ORAL at 08:48

## 2020-07-03 RX ADMIN — OXYCODONE HYDROCHLORIDE AND ACETAMINOPHEN 1 TABLET: 10; 325 TABLET ORAL at 10:48

## 2020-07-03 RX ADMIN — GABAPENTIN 300 MG: 300 CAPSULE ORAL at 08:48

## 2020-07-03 RX ADMIN — OXYCODONE HYDROCHLORIDE AND ACETAMINOPHEN 1 TABLET: 10; 325 TABLET ORAL at 06:46

## 2020-07-03 RX ADMIN — RIVAROXABAN 20 MG: 20 TABLET, FILM COATED ORAL at 08:48

## 2020-07-03 NOTE — PLAN OF CARE
Problem: Patient Care Overview  Goal: Plan of Care Review  Outcome: Ongoing (interventions implemented as appropriate)  Flowsheets  Taken 7/2/2020 1427 by Jeniffer Franks RN  Progress: improving  Taken 7/2/2020 0242 by Alanna Rodarte RN  Plan of Care Reviewed With: patient  Taken 7/3/2020 0142 by Rubi Blackmon RN  Outcome Summary: edema and redness noted to rle/r foot. pulse dopplered at 2+. dressing to bilat groins d/i. up ad homero. pain tolerable with po meds.

## 2020-07-04 ENCOUNTER — READMISSION MANAGEMENT (OUTPATIENT)
Dept: CALL CENTER | Facility: HOSPITAL | Age: 30
End: 2020-07-04

## 2020-07-04 NOTE — OUTREACH NOTE
Prep Survey      Responses   [facility] facility patient discharged from?  [facility]   Is LACE score < 7 ?  No   Eligibility  Readm Mgmt   Discharge diagnosis  Occlusion of artery of lower extremity,  AORTAGRAM, RIGHT LOWER EXTREMITY ANGIOGRAM, RIGHT ILIAC STENT PLACEMENT, BALLOON ANGIOPLASTY, RIGHT LOWER EXTREMITY THROMBECTOMY   COVID-19 Test Status  Negative   Does the patient have one of the following disease processes/diagnoses(primary or secondary)?  Other   Does the patient have Home health ordered?  No   Is there a DME ordered?  No   Comments regarding appointments  see AVS for apmt information   Prep survey completed?  Yes          Quynh Ocampo RN

## 2020-07-06 ENCOUNTER — READMISSION MANAGEMENT (OUTPATIENT)
Dept: CALL CENTER | Facility: HOSPITAL | Age: 30
End: 2020-07-06

## 2020-07-06 NOTE — PAYOR COMM NOTE
"Dc home 7-3-20  [phone]  Fax     Jem Young (30 y.o. Male)     Date of Birth Social Security Number Address Home Phone MRN    1990  [id number] [address] [phone] [id number]    Yazidi Marital Status          [place] Single       Admission Date Admission Type Admitting Provider Attending Provider Department, Room/Bed    6/27/20 Emergency Rj Rios MD  [facility] 3C, 387/1    Discharge Date Discharge Disposition Discharge Destination        7/3/2020 Home or Self Care              Attending Provider:  (none)   Allergies:  No Known Allergies    Isolation:  None   Infection:  None   Code Status:  Prior    Ht:  185.4 cm (72.99\")   Wt:  133 kg (293 lb 3.4 oz)    Admission Cmt:  None   Principal Problem:  None                Active Insurance as of 6/27/2020     Primary Coverage     Payor Plan Insurance Group Employer/Plan Group    [employer]HE[employer] BLUE CROSS ANTHEM BLUE CROSS BLUE SHIELD PPO [id number]     Payor Plan Address Payor Plan Phone Number Payor Plan Fax Number Effective Dates    [address] [phone]  4/1/2020 - None Entered    [place] [address]       Subscriber Name Subscriber Birth Date Member ID       JEM YOUNG 1990 [id number]                 Emergency Contacts      (Rel.) Home Phone Work Phone Mobile Phone    Jennifer Castillo (Significant Other) -- -- [phone]            Physician Progress Notes (last 72 hours) (Notes from 07/03/20 0714 through 07/06/20 0714)    No notes of this type exist for this encounter.         Consult Notes (last 72 hours) (Notes from 07/03/20 0714 through 07/06/20 0714)    No notes of this type exist for this encounter.            Discharge Summary      Rj Rios MD at 07/03/20 0811            Date of Discharge:  7/03/2020    Discharge Diagnosis: Right lower extremity ischemia    Presenting Problem/History of Present Illness  Occlusion of artery of lower extremity " (CMS/[facility]) [I70.209]  Occlusion of artery of lower extremity (CMS/[facility]) [I70.209]       Hospital Course  Patient is a 30 y.o. male presented with right foot and lower leg pain.  He presented here to our ER for a second opinion.  He noted that he had been having what sounds like claudication symptoms to the right lower extremity since back in February or March of this year however since a week or 2 before Memorial Day he had increasing pain to the right foot and distal lower leg with some swelling and erythema.  He ultimately presented to [facility] back at the beginning of June on 6/10 with further findings of right foot pain and concern for ischemia.  Ultimately he was seen by a cardiologist there.  CTA of the abdominal aorta with runoff was performed and report of that study showed almost complete occlusion of the right common iliac artery with thrombus.  There was also extension of thrombus and complete occlusion of the right internal iliac artery and some thrombus extending into the proximal external iliac artery.  There was patency of the common femoral and profunda and SFA as well as the popliteal arteries however there was basically no flow in the tibials below the proximal/mid calf level with almost no outflow into the foot.  Ultimately was taken to the Cath Lab suite in [place] and underwent angiogram with placement of an EKOS catheter in the iliac artery and thrombolysis with TPA infusion was performed.  He was taken back to the Cath Lab the following day and as per op report had resolution of the thrombus in the right iliac artery but continued to have occlusion of the tibials with really no outflow into the foot.  And EKOS catheter was repositioned and placed into the posterior tibial artery and further thrombolysis with TPA infusion was performed.  The patient was then subsequently taken back for a third procedure with angiogram and had attempted angioplasty of the anterior tibial and  posterior tibial arteries with little improvement.  Ultimately no further intervention was performed and he was transitioned onto Xarelto.  He was recommended to continue with nitro patch to the right foot and had been referred to be fit for arterial lower extremity pump to try and increase perfusion of the right lower leg.  Plan was also being made for him to be referred to [place] for further vascular surgery evaluation apparently.  However he continued to have pain and swelling to the right foot to the point where he was having significant difficulty walking and so re-presented to the ER in [place] on 6/22.  Repeat CTA with runoff was again performed and as per the report of this study there was still some thrombus present in the right iliac artery however it was improved from previous.  There was also again noted basically no flow in the tibials from the mid calf distally with no outflow into the foot.  The patient did apparently have a PT Doppler signal and so no further intervention was deemed necessary and he was discharged home the following day.  However again his pain continued and so he presented here to our ER on 6/27.  Here again he had symptoms concerning for ischemia of the right forefoot and toes.  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  Again the common femoral and profunda, as well as the SFA and popliteal arteries were widely patent.  The trifurcation was patent but just beyond the takeoff of the anterior tibial, posterior tibial, and peroneal there appeared to be occlusion with really no significant inflow into the foot.  Clinically he had dusky ischemic changes of the distal forefoot and toes but did have motor and sensation that was still present.  Clinically he had a very weak PT Doppler  signal in a week Doppler signal at the distal AT at the ankle but they were monophasic and very poor.  Ultimately he was admitted here and started on heparin drip and I took him to the operating room on 6/29.  There he had angiogram initially via a left groin approach which again showed the presence of residual thrombus within the right common iliac artery.  Right groin access was obtained and a covered stent was placed across that area to exclude the thrombus in order to prevent any possible further embolization.  Right lower extremity angiogram was then performed and a wire was able to be placed distally in the anterior tibial artery to the ankle and so it was thought that potentially some of this thrombus may be able to be directly removed via thrombectomy.  As such right groin exploration and attempted thrombectomy of the right anterior tibial and posterior tibial arteries was performed.  However only a very minimal amount of very organized, chronic thrombus was able to be removed from the tibials with no real significant improvement in outflow.  Attempts were made to pass [place] catheters all the way out into the dorsalis pedis and pedal arch but the thrombus appeared very organized and no significant thrombus could be again removed.  Unfortunately at this point it was felt that no further intervention would be helpful and so the procedure was ended.  Patient did have some improved perfusion and increased Doppler signals postoperatively with monophasic DP and PT signals present and some improvement in his right proximal foot perfusion however here continue to have some signs of chronic type ischemia to the distal forefoot and toes along with pain.  Nitropatch was placed to the foot and he was transitioned back from heparin drip to Xarelto for continued anticoagulation.  While here work-up of possible sources of embolus from more proximal had been performed with a CTA of the chest as well as an echo which  showed no proximal source of thromboembolism.  Long discussion was had with the patient regarding his ischemic changes and their chronicity and the fact that we were unable to significantly improve any perfusion to the distal lower leg and foot.  We discussed that he would likely at some point require some degree of amputation whether that be TMA or potentially even below-knee amputation this is been discussed prior to surgery.  He was seen by podiatry during this admission and plan is to further observe his distal forefoot for demarcation and then plan for further operative intervention with likely some degree of amputation in the future.  At this point the patient does have DP and PT Doppler signals in his lower extremities warm to the level of the midfoot.  He will be discharged on Xarelto and aspirin and also will be applying a nitro patch to the right foot daily.  He will also have a prescription for pain medication and I will plan to see him back in the office in a week.  I did provide him with a copy of his operative note from my procedure here as well as a copy of his CTA of the right lower extremity and angiogram images as he wishes to obtain a second opinion likely at [facility] in [place] before consenting to undergo any type of amputation of his right lower extremity.  Otherwise he is stable for discharge at this time.  All questions were adequately answered and discharge instructions were given.    Procedures Performed  Procedure(s):  AORTAGRAM, RIGHT LOWER EXTREMITY ANGIOGRAM, RIGHT ILIAC STENT PLACEMENT, BALLOON ANGIOPLASTY, RIGHT LOWER EXTREMITY THROMBECTOMY       Consults:   Consults     Date and Time Order Name Status Description    6/30/2020 1010 Inpatient Podiatry Consult Completed             Condition on Discharge: Stable    Discharge Medications     Discharge Medications      New Medications      Instructions Start Date   aspirin 81 MG EC tablet   81 mg, Oral, Daily      nitroglycerin 0.1  MG/HR patch  Commonly known as:  NITRODUR   1 patch, Transdermal, Daily      oxyCODONE-acetaminophen  MG per tablet  Commonly known as:  PERCOCET   1 tablet, Oral, Every 4 Hours PRN         Continue These Medications      Instructions Start Date   gabapentin 300 MG capsule  Commonly known as:  NEURONTIN   300 mg, Oral, 3 Times Daily      NIFEdipine XL 30 MG 24 hr tablet  Commonly known as:  PROCARDIA XL   30 mg, Oral, Daily      rivaroxaban 20 MG tablet  Commonly known as:  XARELTO   20 mg, Oral, Daily With Dinner         Stop These Medications    HYDROcodone-acetaminophen 7.5-325 MG per tablet  Commonly known as:  NORCO            Discharge Diet:   Diet Instructions     Advance Diet As Tolerated      Diet: Regular      Discharge Diet:  Regular          Activity at Discharge:   Activity Instructions     Activity as Tolerated            Follow-up Appointments  Future Appointments   Date Time Provider Department Center   7/10/2020 11:00 AM Rj Rios MD [facility] None     Additional Instructions for the Follow-ups that You Need to Schedule     Discharge Follow-up with Specified Provider: Vascular-Dr. Rios; 1 Week   As directed      To:  Vascular-Dr. Rios    Follow Up:  1 Week    Follow Up Details:  Follow up on 7/10/2020                Rj Rios MD  07/03/20  08:10              Electronically signed by Rj Rios MD at 07/03/20 0811

## 2020-07-06 NOTE — OUTREACH NOTE
Medical Week 1 Survey      Responses   [facility] patient discharged from?  [place]   COVID-19 Test Status  Negative   Does the patient have one of the following disease processes/diagnoses(primary or secondary)?  Other   Is there a successful TCM telephone encounter documented?  No   Week 1 attempt successful?  No   Unsuccessful attempts  Attempt 1          Norma Samaniego RN

## 2020-07-08 ENCOUNTER — TELEPHONE (OUTPATIENT)
Dept: VASCULAR SURGERY | Facility: CLINIC | Age: 30
End: 2020-07-08

## 2020-07-08 ENCOUNTER — READMISSION MANAGEMENT (OUTPATIENT)
Dept: CALL CENTER | Facility: HOSPITAL | Age: 30
End: 2020-07-08

## 2020-07-08 NOTE — OUTREACH NOTE
Medical Week 1 Survey      Responses   [facility] patient discharged from?  [place]   COVID-19 Test Status  Negative   Does the patient have one of the following disease processes/diagnoses(primary or secondary)?  Other   Is there a successful TCM telephone encounter documented?  No   Week 1 attempt successful?  No   Unsuccessful attempts  Attempt 2          Tika Maritnez RN

## 2020-07-13 ENCOUNTER — READMISSION MANAGEMENT (OUTPATIENT)
Dept: CALL CENTER | Facility: HOSPITAL | Age: 30
End: 2020-07-13

## 2020-07-13 NOTE — OUTREACH NOTE
Medical Week 1 Survey      Responses   [facility] patient discharged from?  [place]   COVID-19 Test Status  Negative   Does the patient have one of the following disease processes/diagnoses(primary or secondary)?  Other   Is there a successful TCM telephone encounter documented?  No   Week 1 attempt successful?  Yes   Call start time  1225   Revoke  Readmitted [He is at [place] having surgery today. ]   Call end time  1227   Discharge diagnosis  Occlusion of artery of lower extremity,  AORTAGRAM, RIGHT LOWER EXTREMITY ANGIOGRAM, RIGHT ILIAC STENT PLACEMENT, BALLOON ANGIOPLASTY, RIGHT LOWER EXTREMITY THROMBECTOMY   Is patient permission given to speak with other caregiver?  No   Week 1 call completed?  Yes          Tika Martinez RN

## 2020-07-22 ENCOUNTER — HOSPITAL ENCOUNTER (OUTPATIENT)
Dept: WOUND CARE | Age: 30
Discharge: HOME | End: 2020-07-22
Payer: COMMERCIAL

## 2020-07-22 VITALS
HEIGHT: 73 IN | SYSTOLIC BLOOD PRESSURE: 130 MMHG | WEIGHT: 262 LBS | BODY MASS INDEX: 34.72 KG/M2 | DIASTOLIC BLOOD PRESSURE: 83 MMHG | TEMPERATURE: 86.3 F | HEART RATE: 85 BPM | RESPIRATION RATE: 20 BRPM

## 2020-07-22 PROBLEM — Z89.511: Status: ACTIVE | Noted: 2020-07-22

## 2020-07-22 PROBLEM — T87.89 NON-PRESSURE ULCER OF STUMP OF BELOW KNEE AMPUTATION OF RIGHT LOWER EXTREMITY WITH FAT LAYER EXPOSED (HCC): Status: ACTIVE | Noted: 2020-07-22

## 2020-07-22 PROBLEM — Z86.79 HISTORY OF ARTERIAL OCCLUSION: Status: ACTIVE | Noted: 2020-07-22

## 2020-07-22 PROBLEM — Z72.0 TOBACCO USE: Status: ACTIVE | Noted: 2020-07-22

## 2020-07-22 PROBLEM — L97.912 NON-PRESSURE ULCER OF STUMP OF BELOW KNEE AMPUTATION OF RIGHT LOWER EXTREMITY WITH FAT LAYER EXPOSED (HCC): Status: ACTIVE | Noted: 2020-07-22

## 2020-07-22 PROCEDURE — 97597 DBRDMT OPN WND 1ST 20 CM/<: CPT | Performed by: NURSE PRACTITIONER

## 2020-07-22 PROCEDURE — 99215 OFFICE O/P EST HI 40 MIN: CPT | Performed by: NURSE PRACTITIONER

## 2020-07-22 PROCEDURE — 99213 OFFICE O/P EST LOW 20 MIN: CPT

## 2020-07-22 PROCEDURE — 97597 DBRDMT OPN WND 1ST 20 CM/<: CPT

## 2020-07-22 RX ORDER — METHOCARBAMOL 750 MG/1
750 TABLET, FILM COATED ORAL 4 TIMES DAILY
COMMUNITY

## 2020-07-22 RX ORDER — LIDOCAINE HYDROCHLORIDE 20 MG/ML
JELLY TOPICAL PRN
Status: DISCONTINUED | OUTPATIENT
Start: 2020-07-22 | End: 2020-07-24 | Stop reason: HOSPADM

## 2020-07-22 RX ORDER — GABAPENTIN 300 MG/1
300 CAPSULE ORAL 3 TIMES DAILY
COMMUNITY

## 2020-07-22 RX ORDER — DULOXETIN HYDROCHLORIDE 20 MG/1
60 CAPSULE, DELAYED RELEASE ORAL NIGHTLY
COMMUNITY

## 2020-07-22 RX ORDER — ACETAMINOPHEN 500 MG
1000 TABLET ORAL EVERY 8 HOURS PRN
COMMUNITY

## 2020-07-22 RX ORDER — MEMANTINE HYDROCHLORIDE 5 MG/1
5 TABLET ORAL 2 TIMES DAILY
COMMUNITY
Start: 2020-07-17 | End: 2020-10-24

## 2020-07-22 RX ORDER — IBUPROFEN 400 MG/1
400 TABLET ORAL EVERY 8 HOURS PRN
COMMUNITY

## 2020-07-22 SDOH — HEALTH STABILITY: MENTAL HEALTH: HOW OFTEN DO YOU HAVE A DRINK CONTAINING ALCOHOL?: NEVER

## 2020-07-22 ASSESSMENT — PAIN DESCRIPTION - DESCRIPTORS: DESCRIPTORS: DULL;THROBBING

## 2020-07-22 ASSESSMENT — PAIN DESCRIPTION - ORIENTATION: ORIENTATION: RIGHT

## 2020-07-22 ASSESSMENT — PAIN SCALES - GENERAL: PAINLEVEL_OUTOF10: 6

## 2020-07-22 ASSESSMENT — ENCOUNTER SYMPTOMS: COLOR CHANGE: 1

## 2020-07-22 ASSESSMENT — PAIN DESCRIPTION - PAIN TYPE: TYPE: ACUTE PAIN

## 2020-07-22 ASSESSMENT — VISUAL ACUITY: OU: 1

## 2020-07-22 ASSESSMENT — PAIN DESCRIPTION - LOCATION: LOCATION: LEG

## 2020-07-22 NOTE — PLAN OF CARE
Negative Pressure    NAME:  Jennifer Francois  YOB: 1990  MEDICAL RECORD NUMBER:  [id number]  DATE:  7/22/2020     Applied Negative Pressure to Right BKA wound(s)/ulcer(s).  [x] Applied skin barrier prep to lisandra-wound.  [x] Cut strips of plastic drape to picture frame wound so that lisandra-wound is     covered with the drape.  [x] If bridging dressing to less prominent site, cover any intact skin that will come in contact with the Negative Pressure Therapy sponge, gauze or channel drain with plastic drape. The sponge should never touch intact skin.  [x] Cut sponge, gauze or channel drain to size which will fit into the wound/ulcer bed without being forced.  [x] Be sure the sponge is large enough to hold the entire round plastic flange which is attached to the tubing. Never allow flange to be larger than the sponge or it will produce suction damaging intact skin.  Total number of individual pieces of foam used within the wound bed: 2     [x] If bridging the dressing away from the primary site, be sure the bridge leads to a piece of sponge large enough to hold the entire flange without allowing any of the flange to overlap onto intact skin.  [x] Covered sponge, gauze or channel drain with plastic drape.  [x] Cut a hole in this plastic drape directly over the sponge the same size as the plastic drain tubing.  [x] Removed plastic liner from flange and apply it directly over the hole you cut.  [x] Removed the plastic cover from the flange.  [x] Attached the tubing to the wound/ulcer Negative Pressure Therapy and turn it on to be sure a vacuum is created and that there are no leaks.  [x] If air leaks occur, use plastic drape to patch them.  [x] Secured Negative Pressure Therapy dressing with ace wrap loosely if located on an extremity. Maintain tubing outside of ace wrap. Tubing must not exert pressure on intact skin.     Applied per  Guidelines      Electronically signed by Gem Thomas RN on 7/22/2020 at 1:15 PM

## 2020-07-22 NOTE — PROGRESS NOTES
Patient Care Team:  ANNELIESE Ho as PCP - General (Physician Assistant)    TODAY'S DATE:  7/22/2020 7/13- Dr. Esme Casas at [facility]  serial procedures for right leg claudication who presented to [facility] on 7/8 with occlusion of right internal iliac, anterior/posterior tibial arteries, and stenosis of right peroneal s/p BKA on 7/13       HISTORY of PRESENTILLNESS HPI   Kal Walker is a 26 y.o. male who presents today for wound evaluation. Mr. Laurie Satnoyo has had a history of right leg pain and numbness to right toes. He was found to have arterial occlusion where he was admitted to [facility]. He had of serial procedures for right leg claudication who presented to [facility] on 7/8 with acute right leg pain and numbness with concern of occlusion of right internal iliac, anterior/posterior tibial arteries, and stenosis of right peroneal s/p angiogram of right leg and placement of lytic catheter on 7/8. Imaging revealed occlusion of right anterior and posterior tibial artery. Patient returned to OR on 7/9 for angiogram, removal of lytic catheter, and fasciotomy of right leg due to development of compartment syndrome. He presents today with a wound vac needing wound care. It is undetermined the cause for the arterial occlusion he will see hematology in the next few weeks. 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 This started 1 week(s) ago. He believes this is  healing. He has been applying wound vac. He has not had  fever orchills.  He has a history of PAD. Sherine oH is a 26 y.o. male with the following history reviewed and recorded in [facility]:    Current Outpatient Medications   Medication Sig Dispense Refill    gabapentin (NEURONTIN) 300 MG capsule Take 300 mg by mouth 3 times daily.  WARFARIN SODIUM PO Take 5 mg by mouth nightly       Enoxaparin Sodium (LOVENOX SC) Inject 120 mg into the skin 2 times daily       methocarbamol (ROBAXIN) 750 MG tablet Take 750 mg by mouth 4 times daily      acetaminophen (TYLENOL) 500 MG tablet Take 1,000 mg by mouth every 8 hours as needed for Pain      ibuprofen (ADVIL;MOTRIN) 400 MG tablet Take 400 mg by mouth every 8 hours as needed for Pain      DULoxetine (CYMBALTA) 20 MG extended release capsule Take 20 mg by mouth nightly      memantine (NAMENDA) 5 MG tablet Take 5 mg by mouth 2 times daily X 10 days then stop      Lidocaine HCl 4 % LIQD Apply 5 mLs topically three times a week 73 mL 3     Current Facility-Administered Medications   Medication Dose Route Frequency Provider Last Rate Last Dose    lidocaine (XYLOCAINE) 2 % jelly   Topical PRN ALONSO Ferris CNP         Allergies: Patient has no known allergies. History reviewed. No pertinent past medical history. Past Surgical History:   Procedure Laterality Date    BACK SURGERY  02/2019    Laminectomy    FACIAL RECONSTRUCTION SURGERY  2007    FASCIOTOMY  07/11/2020    LEG AMPUTATION BELOW KNEE Right 07/13/2020    VASCULAR SURGERY       Family History   Problem Relation Age of Onset    Diabetes Paternal Grandmother      Social History     Tobacco Use    Smoking status: Never Smoker    Smokeless tobacco: Current User   Substance Use Topics    Alcohol use: Never     Frequency: Never         Review of Systems    Review of Systems   Skin: Positive for color change and wound. Psychiatric/Behavioral: The patient is nervous/anxious. All other systems reviewed and are negative.       All other review of systems are negative. Physical Exam    /83   Pulse 85   Temp (!) 86.3 °F (30.2 °C) (Temporal)   Resp 20   Ht 6' 1\" (1.854 m)   Wt 262 lb (118.8 kg)   BMI 34.57 kg/m²     Physical Exam  Vitals signs reviewed. Exam conducted with a chaperone present. Constitutional:       Appearance: Normal appearance. He is obese. HENT:      Head: Normocephalic and atraumatic. Right Ear: External ear normal.      Left Ear: External ear normal.   Eyes:      General: Lids are normal. Lids are everted, no foreign bodies appreciated. Vision grossly intact. Gaze aligned appropriately. Neck:      Musculoskeletal: Normal range of motion. Cardiovascular:      Rate and Rhythm: Normal rate and regular rhythm. Pulses: Normal pulses. Heart sounds: Normal heart sounds. Pulmonary:      Effort: Pulmonary effort is normal.      Breath sounds: Normal breath sounds. Abdominal:      General: Bowel sounds are normal.   Musculoskeletal:         General: Swelling present. Right lower leg: Edema present. Skin:     General: Skin is warm and dry. Findings: Wound present. Neurological:      Mental Status: He is alert and oriented to person, place, and time. Psychiatric:         Mood and Affect: Mood normal.         Behavior: Behavior normal.         Thought Content: Thought content normal.         Judgment: Judgment normal.             Post Debridement Measurements and Assessment:    The patientspain isPain Level: 6 Pain Type: Acute pain. Wound is has improved. Please refer to nursing measurements and assessment regarding wound pre and postdebridement. Wound 07/22/20 Leg Right Right BKA (Active)   Wound Image    07/22/20 0925   Wound Non-Healing Surgical 07/22/20 0925   Offloading for Diabetic Foot Ulcers Wheelchair 07/22/20 0925   Dressing Status Old drainage; Intact 07/22/20 0925   Wound Cleansed Soap and water 07/22/20 0925   Wound Length (cm) 15.5 cm 07/22/20 0925   Wound Width (cm) 16 cm 07/22/20 0589   Wound Depth (cm) 1 cm 07/22/20 0925   Wound Surface Area (cm^2) 248 cm^2 07/22/20 0925   Wound Volume (cm^3) 248 cm^3 07/22/20 0925   Post-Procedure Length (cm) 15.5 cm 07/22/20 0925   Post-Procedure Width (cm) 16 cm 07/22/20 0925   Post-Procedure Depth (cm) 1 cm 07/22/20 0925   Post-Procedure Surface Area (cm^2) 248 cm^2 07/22/20 0925   Post-Procedure Volume (cm^3) 248 cm^3 07/22/20 0925   Wound Assessment Granulation tissue;Pink;Red;Slough; Yellow 07/22/20 0925   Drainage Amount Moderate 07/22/20 0925   Drainage Description Serosanguinous 07/22/20 0925   Odor None 07/22/20 0925   Margins Attached edges 07/22/20 0925   Exposed structure Fascia 07/22/20 0925   Josy-wound Assessment Dry; Intact 07/22/20 0925   Non-staged Wound Description Not applicable 11/44[birth year] 1928   [place]%Wound Bed 45 07/22/20 0925   Red%Wound Bed 45 07/22/20 0925   Yellow%Wound Bed 50 07/22/20 0925   Number of days: 0            Debridement: Selective Debridement/Non-Excisional Debridement    Using curette, scissors and forceps the wound(s)/ulcer(s) was/were sharply debrided down through and including the removal of epidermis and dermis. Devitalized Tissue Debrided:  fibrin, biofilm, slough and exudate    Pre Debridement Measurements:  Are located in the Wound/Ulcer Documentation Flow Sheet    Wound/Ulcer #: 1    Post Debridement Measurements:  Wound/Ulcer Descriptions are Pre Debridement except measurements:          Percent of Wound(s)/Ulcer(s) Debrided: 5%    Total Surface Area Debrided:  12.4 sq cm     Estimated Blood Loss:  Minimal    Hemostasis Achieved:  by pressure    Procedural Pain:  10  / 10     Post Procedural Pain:  10 / 10     Response to treatment:  Well tolerated by patient., With complaints of pain. Assessment    1. Non-pressure ulcer of stump of below knee amputation of right lower extremity with fat layer exposed ([address]    2. History of arterial occlusion    3. Tobacco use    4.  Status post unilateral below knee amputation, right ([facility] [address]          Plan    1. Set up home health for wound care    Planfor wound - Dress per physician order  Treatment:     Compression : Yes   Offloading : Yes   Dressing : see AVS    Discussed importance of nutrition, tobacco cessation, wound care, and plan of care. Patient understanding and questions answered. I spent a total of  60 minutes face to face with the patient. Over 90% of that time was spent on counseling and care coordination. Patient was told that if symptoms worsen or new symptoms develop they are to go to the emergency department immediately. Patient was educated on diagnosis and treatment plan. All of patient's questions were answered, and the patient understands the discharge plan. Discussed appropriate home care of this wound. Wound redressed. Patient instructions were given.   Recommend no smoking  Offloading instructions given

## 2020-07-29 ENCOUNTER — HOSPITAL ENCOUNTER (OUTPATIENT)
Dept: WOUND CARE | Age: 30
Discharge: HOME | End: 2020-07-29
Payer: COMMERCIAL

## 2020-07-29 VITALS
RESPIRATION RATE: 18 BRPM | TEMPERATURE: 95 F | BODY MASS INDEX: 34.72 KG/M2 | HEIGHT: 73 IN | HEART RATE: 80 BPM | WEIGHT: 262 LBS

## 2020-07-29 PROCEDURE — 99213 OFFICE O/P EST LOW 20 MIN: CPT

## 2020-07-29 PROCEDURE — 99213 OFFICE O/P EST LOW 20 MIN: CPT | Performed by: SURGERY

## 2020-07-29 RX ORDER — BACITRACIN, NEOMYCIN, POLYMYXIN B 400; 3.5; 5 [USP'U]/G; MG/G; [USP'U]/G
OINTMENT TOPICAL ONCE
Status: CANCELLED | OUTPATIENT
Start: 2020-07-29

## 2020-07-29 RX ORDER — BACITRACIN ZINC AND POLYMYXIN B SULFATE 500; 1000 [USP'U]/G; [USP'U]/G
OINTMENT TOPICAL ONCE
Status: CANCELLED | OUTPATIENT
Start: 2020-07-29

## 2020-07-29 RX ORDER — SODIUM HYPOCHLORITE 1.25 MG/ML
SOLUTION TOPICAL
Qty: 1 BOTTLE | Refills: 3 | Status: SHIPPED | OUTPATIENT
Start: 2020-07-29

## 2020-07-29 RX ORDER — BETAMETHASONE DIPROPIONATE 0.05 %
OINTMENT (GRAM) TOPICAL ONCE
Status: CANCELLED | OUTPATIENT
Start: 2020-07-29

## 2020-07-29 RX ORDER — GENTAMICIN SULFATE 1 MG/G
OINTMENT TOPICAL ONCE
Status: CANCELLED | OUTPATIENT
Start: 2020-07-29

## 2020-07-29 RX ORDER — GINSENG 100 MG
CAPSULE ORAL ONCE
Status: CANCELLED | OUTPATIENT
Start: 2020-07-29

## 2020-07-29 RX ORDER — CLOBETASOL PROPIONATE 0.5 MG/G
OINTMENT TOPICAL ONCE
Status: CANCELLED | OUTPATIENT
Start: 2020-07-29

## 2020-07-29 RX ORDER — LIDOCAINE HYDROCHLORIDE 40 MG/ML
SOLUTION TOPICAL ONCE
Status: CANCELLED | OUTPATIENT
Start: 2020-07-29

## 2020-07-29 NOTE — PROGRESS NOTES
ibuprofen (ADVIL;MOTRIN) 400 MG tablet Take 400 mg by mouth every 8 hours as needed for Pain      DULoxetine (CYMBALTA) 20 MG extended release capsule Take 20 mg by mouth nightly      memantine (NAMENDA) 5 MG tablet Take 5 mg by mouth 2 times daily X 10 days then stop      Lidocaine HCl 4 % LIQD Apply 5 mLs topically three times a week 73 mL 3     No current facility-administered medications on file prior to encounter. REVIEW OF SYSTEMS    A comprehensive review of systems was negative.     Objective:      Pulse 80   Temp 95 °F (35 °C) (Temporal)   Resp 18   Ht 6' 1\" (1.854 m)   Wt 262 lb (118.8 kg)   BMI 34.57 kg/m²     Wt Readings from Last 3 Encounters:   07/29/20 262 lb (118.8 kg)   07/22/20 262 lb (118.8 kg)       PHYSICAL EXAM    General Appearance: alert and oriented to person, place and time, well developed and well- nourished, in no acute distress  Skin: warm and dry, no rash or erythema  Head: normocephalic and atraumatic  Eyes: pupils equal, round, and reactive to light, extraocular eye movements intact, conjunctivae normal  ENT: tympanic membrane, external ear and ear canal normal bilaterally, nose without deformity, nasal mucosa and turbinates normal without polyps, lips teeth and gums normal  Neck: supple and non-tender without mass, no thyromegaly or thyroid nodules, no cervical lymphadenopathy  Pulmonary/Chest: clear to auscultation bilaterally- no wheezes, rales or rhonchi, normal air movement, no respiratory distress  Cardiovascular: normal rate, regular rhythm, normal S1 and S2, no murmurs, rubs, clicks, or gallops, distal pulses intact, no carotid bruits  Abdomen: soft, non-tender, non-distended, normal bowel sounds, no masses or organomegaly  Extremities: no cyanosis, clubbing or edema  Musculoskeletal: normal range of motion, no joint swelling, deformity or tenderness  Neurologic: reflexes normal and symmetric, no cranial nerve deficit, gait, coordination and speech normal, sensation of skin normal  s/p R BKA      Assessment:      Patient Active Problem List   Diagnosis Code    Non-pressure ulcer of stump of below knee amputation of right lower extremity with fat layer exposed ([facility] [facility] T87.89, L97.912    History of arterial occlusion Z86.718    Tobacco use Z72.0    Status post unilateral below knee amputation, right ([facility] [address] [facility] Z89.511             Wound 07/22/20 Leg Right Right BKA (Active)   Wound Image   07/29/20 0915   Wound Non-Healing Surgical 07/29/20 0915   Offloading for Diabetic Foot Ulcers Wheelchair 07/29/20 0915   Dressing Status Old drainage 07/29/20 0915   Wound Cleansed Rinsed/Irrigated with saline 07/29/20 0915   Wound Length (cm) 15 cm 07/29/20 0915   Wound Width (cm) 14 cm 07/29/20 0915   Wound Depth (cm) 0.8 cm 07/29/20 0915   Wound Surface Area (cm^2) 210 cm^2 07/29/20 0915   Change in Wound Size % (l*w) 15.32 07/29/20 0915   Wound Volume (cm^3) 168 cm^3 07/29/20 0915   Wound Healing % 32 07/29/20 0915   Post-Procedure Length (cm) 15.5 cm 07/22/20 0925   Post-Procedure Width (cm) 16 cm 07/22/20 0925   Post-Procedure Depth (cm) 1 cm 07/22/20 0925   Post-Procedure Surface Area (cm^2) 248 cm^2 07/22/20 0925   Post-Procedure Volume (cm^3) 248 cm^3 07/22/20 0925   Distance Tunneling (cm) 0 cm 07/29/20 0915   Tunneling Position ___ O'Clock 0 07/29/20 0915   Undermining Starts ___ O'Clock 0 07/29/20 0915   Undermining Ends___ O'Clock 0 07/29/20 0915   Undermining Maxium Distance (cm) 0 07/29/20 0915   Wound Assessment Granulation tissue;Pink;Red;Slough; Yellow 07/29/20 0915   Drainage Amount Moderate 07/29/20 0915   Drainage Description Serosanguinous 07/29/20 0915   Odor None 07/29/20 0915   Margins Attached edges 07/29/20 0915   Exposed structure Fascia 07/29/20 0915   Josy-wound Assessment Dry; Intact 07/29/20 0915   Non-staged Wound Description Not applicable 47/45/23 9121   [place]%Wound Bed 50 07/29/20 0915   Red%Wound Bed 50 07/29/20 0915   Yellow%Wound Bed 0 07/29/20 0915 Black%Wound Bed 0 07/29/20 0915   Purple%Wound Bed 0 07/29/20 0915   Other%Wound Bed 0 07/29/20 0915   Number of days: 7             Plan:     Problem List Items Addressed This Visit     Non-pressure ulcer of stump of below knee amputation of right lower extremity with fat layer exposed ([facility] [address] - Primary    Relevant Medications    sodium hypochlorite (DAKINS) 0.125 % SOLN external solution    Other Relevant Orders    Supply: Wound Cleanser    Tobacco use    Relevant Orders    Supply: Wound Cleanser    * (Principal) Status post unilateral below knee amputation, right (HCC)    Relevant Medications    sodium hypochlorite (DAKINS) 0.125 % SOLN external solution    Other Relevant Orders    Supply: Wound Cleanser        Current vac is not working will try [employer]. Dakin's dressiings BID til next visit      Treatment Note please see attached Discharge Instructions    In my professional opinion this patient would benefit from HBO Therapy: No    Written patient dismissal instructions given to patient and signed by patient or POA. Discharge [address] and Hyperbaric Oxygen Therapy   Physician Orders and Discharge Instructions  [address]  Telephone: [phone]    NAME:  Basil Armstrong  YOB: 1990  MEDICAL RECORD NUMBER:  [id number]  DATE:  7/29/2020    Discharge condition: Stable    Discharge to: Home    Left via:Private automobile    Accompanied by: [address] until next appointment (I am trying to get a [employer] vac)    Dressing Orders: May apply 5-10 mL Lidocaine 4% topical into wound vac sponge or tubing and allow the solution to  rest for 5-10 minutes prior to vac removal.    Left BKA  Wash with soap and water. Apply Dakins moist gauze to wound bed. Cover with dry gauze. Secure with roll gauze and medipore tape. Change twice daily .   Use 6 inch ace wrap and elevation to reduce and prevent swelling  Wear stump protector daily    Treatment Orders: Protein rich diet (unless restricted by your physician); Multivitamin daily; Elevate legs when    [facility] [facility] followup visit _____________1 week________________  (Please note your next appointment above and if you are unable to keep, kindly give a 24 hour notice. Thank you.)    If you experience any of the following, please call the [facility] during business hours:    * Increase in Pain  * Temperature over 101  * Increase in drainage from your wound  * Drainage with a foul odor  * Bleeding  * Increase in swelling  * Need for compression bandage changes due to slippage, breakthrough drainage. If you need medical attention outside of the business hours of the [facility] please contact your PCP or go to the nearest emergency room.         Electronically signed by Gurdeep Grande MD on 7/29/2020 at 10:27 AM

## 2020-07-29 NOTE — PROGRESS NOTES
Order for [facility] Negative Pressure Wound Therapy      Patient Information:   Lluvia Russell  [address] [facility]   [phone]   : 1990  AGE: 26 y.o. GENDER: male   TODAYS DATE:  2020  Insurance:   PRIMARY INSURANCE:  Plan: [facility]ie [place] LOCAL  Coverage: [facility]  Effective Date: 2020  [id number] - ([address] Managed)      Patient Wound Information:     Duration of the V.A.C.®  Negative Pressure Wound Therapy: 4 Month which includes up to 15 dressings per wound and up to 10 canisters per month    Goal at the completion of V.A.C.®  Negative Pressure Therapy: Assist in granulation tissue formation     Will HomeCare provide V.A.C. ®  Therapy? Yes Homecare will provide VAC Therapy. The date in which Homecare will initiate [facility] Therapy is 20     Supplies for Delivery:     Delivery [place] [id number] [address] [facility] ,[address] [place] [address]    V. A.C.® Dressing: Other VAC Dressing Granufoam thin    Clinical Information by Wound Type: Was NPWT initiated in an inpatient facility? Yes NPWT was initiated on this date 20 or  Has the patient been on NPWT anytime during the last 60 days? Yes Name of [address] [place] [address]     Is the patients nutritional status compromised? No    Please kellie all dressings that apply  Please indicate all other therapies that have been previously tried and/or failed to maintain a moist wound environment: Absorptive    Please kellie all that apply  If other therapies were considered and ruled out, what conditions prevented you from using other therapies prior to applying V.A.C. ® Therapy? Need for accelerated granulation tissue    Please kellie all that apply  Which of the following co?morbidities apply? Other Blood Clotting Disorder    Is Osteomyelitis present in Wound?  No      Patient's Primary Wound Type:     Patient's Primary Wound Type:  Surgical Please include a description of surgical procedure Right BKA    Is this wound a direct result of an accident? No   Wound(s) Description:     Is there eschar tissue present in the wound? No Eschar in noted in Wound(s) base  Has debridement been attempted in the last 10 days? Yes Date 7/29/20 type of debridement Selective Debridement/Non-Excisional Debridement  Are serial debridements required? Yes    Wound 07/22/20 Leg Right Right BKA (Active)   Wound Image   07/29/20 0915   Wound Non-Healing Surgical 07/29/20 0915   Offloading for Diabetic Foot Ulcers Wheelchair 07/29/20 0915   Dressing Status Old drainage 07/29/20 0915   Wound Cleansed Rinsed/Irrigated with saline 07/29/20 0915   Wound Length (cm) 15 cm 07/29/20 0915   Wound Width (cm) 14 cm 07/29/20 0915   Wound Depth (cm) 0.8 cm 07/29/20 0915   Wound Surface Area (cm^2) 210 cm^2 07/29/20 0915   Change in Wound Size % (l*w) 15.32 07/29/20 0915   Wound Volume (cm^3) 168 cm^3 07/29/20 0915   Wound Healing % 32 07/29/20 0915   Post-Procedure Length (cm) 15.5 cm 07/22/20 0925   Post-Procedure Width (cm) 16 cm 07/22/20 0925   Post-Procedure Depth (cm) 1 cm 07/22/20 0925   Post-Procedure Surface Area (cm^2) 248 cm^2 07/22/20 0925   Post-Procedure Volume (cm^3) 248 cm^3 07/22/20 0925   Distance Tunneling (cm) 0 cm 07/29/20 0915   Tunneling Position ___ O'Clock 0 07/29/20 0915   Undermining Starts ___ O'Clock 0 07/29/20 0915   Undermining Ends___ O'Clock 0 07/29/20 0915   Undermining Maxium Distance (cm) 0 07/29/20 0915   Wound Assessment Granulation tissue;Pink;Red;Slough; Yellow 07/29/20 0915   Drainage Amount Moderate 07/29/20 0915   Drainage Description Serosanguinous 07/29/20 0915   Odor None 07/29/20 0915   Margins Attached edges 07/29/20 0915   Exposed structure Fascia 07/29/20 0915   Josy-wound Assessment Dry; Intact 07/29/20 0915   Non-staged Wound Description Not applicable 70/11/21 7004   [place]%Wound Bed 50 07/29/20 0915   Red%Wound Bed 50 07/29/20 0915   Yellow%Wound Bed 0 07/29/20 0915   Black%Wound Bed 0 07/29/20 0915   Purple%Wound Bed 0 07/29/20 0915   Other%Wound Bed 0 07/29/20 0915   Number of days: 7       Electronically signed by Brennen Miner RN on 7/29/2020 at 10:24 AM

## 2020-08-05 ENCOUNTER — HOSPITAL ENCOUNTER (OUTPATIENT)
Dept: WOUND CARE | Age: 30
Discharge: HOME | End: 2020-08-05
Payer: COMMERCIAL

## 2020-08-05 VITALS
WEIGHT: 262 LBS | RESPIRATION RATE: 18 BRPM | HEIGHT: 73 IN | SYSTOLIC BLOOD PRESSURE: 129 MMHG | DIASTOLIC BLOOD PRESSURE: 81 MMHG | TEMPERATURE: 98 F | BODY MASS INDEX: 34.72 KG/M2 | HEART RATE: 84 BPM

## 2020-08-05 PROCEDURE — 11045 DBRDMT SUBQ TISS EACH ADDL: CPT

## 2020-08-05 PROCEDURE — 97606 NEG PRS WND THER DME>50 SQCM: CPT

## 2020-08-05 PROCEDURE — 11045 DBRDMT SUBQ TISS EACH ADDL: CPT | Performed by: SURGERY

## 2020-08-05 PROCEDURE — 11042 DBRDMT SUBQ TIS 1ST 20SQCM/<: CPT

## 2020-08-05 PROCEDURE — 11042 DBRDMT SUBQ TIS 1ST 20SQCM/<: CPT | Performed by: SURGERY

## 2020-08-05 RX ORDER — BETAMETHASONE DIPROPIONATE 0.05 %
OINTMENT (GRAM) TOPICAL ONCE
Status: CANCELLED | OUTPATIENT
Start: 2020-08-05

## 2020-08-05 RX ORDER — CLOBETASOL PROPIONATE 0.5 MG/G
OINTMENT TOPICAL ONCE
Status: CANCELLED | OUTPATIENT
Start: 2020-08-05

## 2020-08-05 RX ORDER — BACITRACIN ZINC AND POLYMYXIN B SULFATE 500; 1000 [USP'U]/G; [USP'U]/G
OINTMENT TOPICAL ONCE
Status: CANCELLED | OUTPATIENT
Start: 2020-08-05

## 2020-08-05 RX ORDER — GENTAMICIN SULFATE 1 MG/G
OINTMENT TOPICAL ONCE
Status: CANCELLED | OUTPATIENT
Start: 2020-08-05

## 2020-08-05 RX ORDER — BACITRACIN, NEOMYCIN, POLYMYXIN B 400; 3.5; 5 [USP'U]/G; MG/G; [USP'U]/G
OINTMENT TOPICAL ONCE
Status: CANCELLED | OUTPATIENT
Start: 2020-08-05

## 2020-08-05 RX ORDER — GINSENG 100 MG
CAPSULE ORAL ONCE
Status: CANCELLED | OUTPATIENT
Start: 2020-08-05

## 2020-08-05 RX ORDER — LIDOCAINE HYDROCHLORIDE 40 MG/ML
SOLUTION TOPICAL ONCE
Status: CANCELLED | OUTPATIENT
Start: 2020-08-05

## 2020-08-05 ASSESSMENT — PAIN DESCRIPTION - ORIENTATION: ORIENTATION: RIGHT

## 2020-08-05 ASSESSMENT — PAIN DESCRIPTION - DESCRIPTORS: DESCRIPTORS: TENDER;SORE

## 2020-08-05 ASSESSMENT — PAIN DESCRIPTION - PAIN TYPE: TYPE: ACUTE PAIN

## 2020-08-05 ASSESSMENT — PAIN DESCRIPTION - ONSET: ONSET: ON-GOING

## 2020-08-05 ASSESSMENT — PAIN DESCRIPTION - LOCATION: LOCATION: LEG

## 2020-08-05 ASSESSMENT — PAIN DESCRIPTION - FREQUENCY: FREQUENCY: CONTINUOUS

## 2020-08-05 ASSESSMENT — PAIN SCALES - GENERAL: PAINLEVEL_OUTOF10: 6

## 2020-08-05 ASSESSMENT — PAIN DESCRIPTION - PROGRESSION: CLINICAL_PROGRESSION: NOT CHANGED

## 2020-08-05 NOTE — PROGRESS NOTES
[address] [address]   Progress Note and Procedure Note      Melisa Garcia RECORD NUMBER:  [id number]  AGE: 26 y.o. GENDER: male  : 1990  EPISODE DATE:  2020    Subjective:     Chief Complaint   Patient presents with    Wound Check     follow up         HISTORY of PRESENT ILLNESS HPI     Shaan Whiting is a 26 y.o. male who presents today for wound/ulcer evaluation. Wound Context: Pt s/p R BKA here for eval/treat  Wound/Ulcer Pain Timing/Severity: none  Quality of pain: N/A  Severity:  0 / 10   Modifying Factors: None  Associated Signs/Symptoms: none    Ulcer Identification:  Ulcer Type: non-healing surgical  Contributing Factors: shear force and smoking    Wound: surgical        PAST MEDICAL HISTORY    History reviewed. No pertinent past medical history. PAST SURGICAL HISTORY    Past Surgical History:   Procedure Laterality Date    BACK SURGERY  2019    Laminectomy    FACIAL RECONSTRUCTION SURGERY  2007    FASCIOTOMY  2020    LEG AMPUTATION BELOW KNEE Right 2020    VASCULAR SURGERY         FAMILY HISTORY    Family History   Problem Relation Age of Onset    Diabetes Paternal Grandmother        SOCIAL HISTORY    Social History     Tobacco Use    Smoking status: Never Smoker    Smokeless tobacco: Current User   Substance Use Topics    Alcohol use: Never     Frequency: Never    Drug use: Never       ALLERGIES    No Known Allergies    MEDICATIONS    Current Outpatient Medications on File Prior to Encounter   Medication Sig Dispense Refill    sodium hypochlorite (DAKINS) 0.125 % SOLN external solution Moisten gauze apply to wound twice daily 1 Bottle 3    gabapentin (NEURONTIN) 300 MG capsule Take 300 mg by mouth 3 times daily.       WARFARIN SODIUM PO Take 5 mg by mouth nightly       Enoxaparin Sodium (LOVENOX SC) Inject 120 mg into the skin 2 times daily       methocarbamol (ROBAXIN) 750 MG tablet Take 750 mg by mouth 4 times daily      acetaminophen (TYLENOL) 500 MG tablet Take 1,000 mg by mouth every 8 hours as needed for Pain      ibuprofen (ADVIL;MOTRIN) 400 MG tablet Take 400 mg by mouth every 8 hours as needed for Pain      DULoxetine (CYMBALTA) 20 MG extended release capsule Take 20 mg by mouth nightly      Lidocaine HCl 4 % LIQD Apply 5 mLs topically three times a week 73 mL 3    memantine (NAMENDA) 5 MG tablet Take 5 mg by mouth 2 times daily X 10 days then stop       No current facility-administered medications on file prior to encounter. REVIEW OF SYSTEMS    A comprehensive review of systems was negative.     Objective:      /81   Pulse 84   Temp 98 °F (36.7 °C) (Temporal)   Resp 18   Ht 6' 1\" (1.854 m)   Wt 262 lb (118.8 kg)   BMI 34.57 kg/m²     Wt Readings from Last 3 Encounters:   08/05/20 262 lb (118.8 kg)   07/29/20 262 lb (118.8 kg)   07/22/20 262 lb (118.8 kg)       PHYSICAL EXAM    General Appearance: alert and oriented to person, place and time, well developed and well- nourished, in no acute distress  Skin: warm and dry, no rash or erythema  Head: normocephalic and atraumatic  Eyes: pupils equal, round, and reactive to light, extraocular eye movements intact, conjunctivae normal  ENT: tympanic membrane, external ear and ear canal normal bilaterally, nose without deformity, nasal mucosa and turbinates normal without polyps, lips teeth and gums normal  Neck: supple and non-tender without mass, no thyromegaly or thyroid nodules, no cervical lymphadenopathy  Pulmonary/Chest: clear to auscultation bilaterally- no wheezes, rales or rhonchi, normal air movement, no respiratory distress  Cardiovascular: normal rate, regular rhythm, normal S1 and S2, no murmurs, rubs, clicks, or gallops, distal pulses intact, no carotid bruits  Abdomen: soft, non-tender, non-distended, normal bowel sounds, no masses or organomegaly  Extremities: no cyanosis, clubbing or edema  Musculoskeletal: normal range of motion, no joint swelling, deformity or tenderness  Neurologic: reflexes normal and symmetric, no cranial nerve deficit, gait, coordination and speech normal, sensation of skin normal  s/p R BKA      Assessment:      Problem List Items Addressed This Visit     * (Principal) Non-pressure ulcer of stump of below knee amputation of right lower extremity with fat layer exposed ([address] - Primary    Relevant Orders    Supply: Wound Cleanser    Tobacco use    Relevant Orders    Supply: Wound Cleanser    Status post unilateral below knee amputation, right (HCC)    Relevant Orders    Supply: Wound Cleanser           Procedure Note  Indications:  Based on my examination of this patient's wound(s)/ulcer(s) today, debridement is required to promote healing and evaluate the wound base. Performed by: Mer Barrientos MD    Consent obtained:  Yes    Time out taken:  Yes    Pain Control:         Debridement:Excisional Debridement    Using curette the wound(s)/ulcer(s) was/were sharply debrided down through and including the removal of epidermis, dermis and subcutaneous tissue.         Devitalized Tissue Debrided:  fibrin, biofilm, slough, necrotic/eschar and exudate      Pre Debridement Measurements:  Are located in the Wound/Ulcer Documentation Flow Sheet    Wound/Ulcer #: 1    Percent of Wound(s)/Ulcer(s) Debrided: 100%    Total Surface Area Debrided:  144 sq cm       Diabetic/Pressure/Non Pressure Ulcers only:  Ulcer: Non-Pressure ulcer, fat layer exposed           Post Debridement Measurements:    Wound/Ulcer Descriptions are Pre Debridement --EXCEPT MEASUREMENTS    Wound 07/22/20 Leg Right Right BKA (Active)   Wound Image   08/05/20 0910   Wound Non-Healing Surgical 08/05/20 0910   Offloading for Diabetic Foot Ulcers Wheelchair 08/05/20 0910   Dressing Status Old drainage 08/05/20 0910   Wound Cleansed Rinsed/Irrigated with saline 08/05/20 0910   Wound Length (cm) 15 cm 08/05/20 0910   Wound Width (cm) 12 cm 08/05/20 0910   Wound Depth (cm) 1.6 cm 08/05/20 0910   Wound Surface Area (cm^2) 180 cm^2 08/05/20 0910   Change in Wound Size % (l*w) 27.42 08/05/20 0910   Wound Volume (cm^3) 288 cm^3 08/05/20 0910   Wound Healing % -16 08/05/20 0910   Post-Procedure Length (cm) 12 cm 08/05/20 0931   Post-Procedure Width (cm) 12 cm 08/05/20 0931   Post-Procedure Depth (cm) 1.6 cm 08/05/20 0931   Post-Procedure Surface Area (cm^2) 144 cm^2 08/05/20 0931   Post-Procedure Volume (cm^3) 230.4 cm^3 08/05/20 0931   Distance Tunneling (cm) 0 cm 08/05/20 0910   Tunneling Position ___ O'Clock 0 08/05/20 0910   Undermining Starts ___ O'Clock 0 08/05/20 0910   Undermining Ends___ O'Clock 0 08/05/20 0910   Undermining Maxium Distance (cm) 0 08/05/20 0910   Wound Assessment Granulation tissue;Pink;Red;Slough; Yellow 08/05/20 0910   Drainage Amount Moderate 08/05/20 0910   Drainage Description Serosanguinous 08/05/20 0910   Odor None 08/05/20 0910   Margins Attached edges 08/05/20 0910   Exposed structure Fascia 08/05/20 0910   Josy-wound Assessment Dry; Intact 08/05/20 0910   Non-staged Wound Description Not applicable 71/50/17 1473   [place]%Wound Bed 50 08/05/20 0910   Red%Wound Bed 50 08/05/20 0910   Yellow%Wound Bed 0 08/05/20 0910   Black%Wound Bed 0 08/05/20 0910   Purple%Wound Bed 0 08/05/20 0910   Other%Wound Bed 0 07/29/20 0915   Number of days: 14             Estimated Blood Loss:  Minimal    Hemostasis Achieved:  by pressure    Procedural Pain:  0  / 10     Post Procedural Pain:  0 / 10     Response to treatment:  Well tolerated by patient.          Plan:     Problem List Items Addressed This Visit     * (Principal) Non-pressure ulcer of stump of below knee amputation of right lower extremity with fat layer exposed ([facility] [address] - Primary    Relevant Orders    Supply: Wound Cleanser    Tobacco use    Relevant Orders    Supply: Wound Cleanser    Status post unilateral below knee amputation, right (HCC)    Relevant Orders    Supply: Wound Cleanser          Cont vac    Treatment Note please see attached Discharge Instructions    In my professional opinion this patient would benefit from HBO Therapy: No    Written patient dismissal instructions given to patient and signed by patient or POA. Discharge [address] and Hyperbaric Oxygen Therapy   Physician Orders and Discharge Instructions  [address]  Telephone: [phone]    NAME:  Clarence Prather  YOB: 1990  MEDICAL RECORD NUMBER:  [id number]  DATE:  8/5/2020    Discharge condition: Stable    Discharge to: Home    Left via:Private automobile    Accompanied by: Family    [facility]    Dressing Orders: May apply 5-10 mL Lidocaine 4% topical into wound vac sponge or tubing and allow the solution to  rest for 5-10 minutes prior to vac removal.    Right BKA:  Wash with soap and water  Apply wound vac as follows: Apply Skin Prep to periwound. Apply drape - window pane to surrounding area  (periwound). May use duoderm instead of drape to prevent skin breakdown. If skin becomes red due to tape irritation please apply skin prep. Then apply light dusting of ostomy powder or  antifungal powder to periwound. Then blot with skin prep to form crusting to protect skin. May repeat skin prep,  powdering steps until proper crusting is made. Then apply drape to periwound. DO NOT PUT FOAM ON GOOD SKIN. Apply black foam to wound bed. BRIDGE above wound site. Set wound vac to  125 mmHG, continuous. Change wound vac dressing 3 times per week (MWF or TTS)  Reapply vac dressing if vac stops working or dressing begins to leak or cannot be reinforced. Apply 6\" ACE Wrap after application of wound vac. Alternative dressing: Wash with soap and water. Apply normal saline moistened gauze to wound bed. Cover with gauze. Secure with medipore tape. Change twice daily .     Treatment Orders: Protein rich diet (unless restricted by your physician); Multivitamin daily; Elevate legs when sitting    [facility] followup visit _____________1 week________________  (Please note your next appointment above and if you are unable to keep, kindly give a 24 hour notice. Thank you.)    If you experience any of the following, please call the [address] [place] [address] [facility]:    * Increase in Pain  * Temperature over 101  * Increase in drainage from your wound  * Drainage with a foul odor  * Bleeding  * Increase in swelling  * Need for compression bandage changes due to slippage, breakthrough drainage. If you need medical attention outside of the business hours of the [address] [place] [address] [address] please contact your PCP or go to the nearest emergency room.         Electronically signed by Michaelle Cao MD on 8/5/2020 at 9:33 AM

## 2020-08-05 NOTE — PLAN OF CARE
Negative Pressure    NAME:  Lluvia Russell  YOB: 1990  MEDICAL RECORD NUMBER:  [id number]  DATE:  8/5/2020     Applied Negative Pressure to Right BKA wound(s)/ulcer(s).  [x] Applied skin barrier prep to lisandra-wound.  [x] Cut strips of plastic drape to picture frame wound so that lisandra-wound is     covered with the drape.  [x] If bridging dressing to less prominent site, cover any intact skin that will come in contact with the Negative Pressure Therapy sponge, gauze or channel drain with plastic drape. The sponge should never touch intact skin.  [x] Cut sponge, gauze or channel drain to size which will fit into the wound/ulcer bed without being forced.  [x] Be sure the sponge is large enough to hold the entire round plastic flange which is attached to the tubing. Never allow flange to be larger than the sponge or it will produce suction damaging intact skin.  Total number of individual pieces of foam used within the wound bed: 2     [x] If bridging the dressing away from the primary site, be sure the bridge leads to a piece of sponge large enough to hold the entire flange without allowing any of the flange to overlap onto intact skin.  [x] Covered sponge, gauze or channel drain with plastic drape.  [x] Cut a hole in this plastic drape directly over the sponge the same size as the plastic drain tubing.  [x] Removed plastic liner from flange and apply it directly over the hole you cut.  [x] Removed the plastic cover from the flange.  [x] Attached the tubing to the wound/ulcer Negative Pressure Therapy and turn it on to be sure a vacuum is created and that there are no leaks.  [x] If air leaks occur, use plastic drape to patch them.  [x] Secured Negative Pressure Therapy dressing with ace wrap loosely if located on an extremity. Maintain tubing outside of ace wrap. Tubing must not exert pressure on intact skin.     Applied per  Guidelines      Electronically signed by Jasmine Aguilar RN on 8/5/2020 at 10:34 AM

## 2020-08-12 ENCOUNTER — HOSPITAL ENCOUNTER (OUTPATIENT)
Dept: WOUND CARE | Age: 30
Discharge: HOME | End: 2020-08-12
Payer: COMMERCIAL

## 2020-08-12 VITALS
HEIGHT: 73 IN | WEIGHT: 262 LBS | RESPIRATION RATE: 16 BRPM | TEMPERATURE: 98 F | HEART RATE: 80 BPM | BODY MASS INDEX: 34.72 KG/M2

## 2020-08-12 PROCEDURE — 6370000000 HC RX 637 (ALT 250 FOR IP): Performed by: SURGERY

## 2020-08-12 PROCEDURE — 11042 DBRDMT SUBQ TIS 1ST 20SQCM/<: CPT | Performed by: SURGERY

## 2020-08-12 PROCEDURE — 11042 DBRDMT SUBQ TIS 1ST 20SQCM/<: CPT

## 2020-08-12 PROCEDURE — 11045 DBRDMT SUBQ TISS EACH ADDL: CPT

## 2020-08-12 PROCEDURE — 11045 DBRDMT SUBQ TISS EACH ADDL: CPT | Performed by: SURGERY

## 2020-08-12 RX ORDER — LIDOCAINE HYDROCHLORIDE 40 MG/ML
SOLUTION TOPICAL ONCE
Status: COMPLETED | OUTPATIENT
Start: 2020-08-12 | End: 2020-08-12

## 2020-08-12 RX ORDER — LIDOCAINE HYDROCHLORIDE 40 MG/ML
SOLUTION TOPICAL ONCE
Status: CANCELLED | OUTPATIENT
Start: 2020-08-12

## 2020-08-12 RX ORDER — BETAMETHASONE DIPROPIONATE 0.05 %
OINTMENT (GRAM) TOPICAL ONCE
Status: CANCELLED | OUTPATIENT
Start: 2020-08-12

## 2020-08-12 RX ORDER — BACITRACIN, NEOMYCIN, POLYMYXIN B 400; 3.5; 5 [USP'U]/G; MG/G; [USP'U]/G
OINTMENT TOPICAL ONCE
Status: CANCELLED | OUTPATIENT
Start: 2020-08-12

## 2020-08-12 RX ORDER — BACITRACIN ZINC AND POLYMYXIN B SULFATE 500; 1000 [USP'U]/G; [USP'U]/G
OINTMENT TOPICAL ONCE
Status: CANCELLED | OUTPATIENT
Start: 2020-08-12

## 2020-08-12 RX ORDER — CLOBETASOL PROPIONATE 0.5 MG/G
OINTMENT TOPICAL ONCE
Status: CANCELLED | OUTPATIENT
Start: 2020-08-12

## 2020-08-12 RX ORDER — GENTAMICIN SULFATE 1 MG/G
OINTMENT TOPICAL ONCE
Status: CANCELLED | OUTPATIENT
Start: 2020-08-12

## 2020-08-12 RX ORDER — GINSENG 100 MG
CAPSULE ORAL ONCE
Status: CANCELLED | OUTPATIENT
Start: 2020-08-12

## 2020-08-12 RX ADMIN — LIDOCAINE HYDROCHLORIDE: 40 SOLUTION TOPICAL at 09:35

## 2020-08-12 ASSESSMENT — PAIN SCALES - GENERAL: PAINLEVEL_OUTOF10: 6

## 2020-08-12 ASSESSMENT — PAIN DESCRIPTION - PAIN TYPE: TYPE: ACUTE PAIN

## 2020-08-12 ASSESSMENT — PAIN DESCRIPTION - DESCRIPTORS: DESCRIPTORS: SORE

## 2020-08-12 ASSESSMENT — PAIN DESCRIPTION - ORIENTATION: ORIENTATION: RIGHT

## 2020-08-12 ASSESSMENT — PAIN DESCRIPTION - PROGRESSION: CLINICAL_PROGRESSION: NOT CHANGED

## 2020-08-12 ASSESSMENT — PAIN DESCRIPTION - LOCATION: LOCATION: LEG

## 2020-08-12 ASSESSMENT — PAIN DESCRIPTION - FREQUENCY: FREQUENCY: CONTINUOUS

## 2020-08-12 ASSESSMENT — PAIN DESCRIPTION - ONSET: ONSET: ON-GOING

## 2020-08-12 NOTE — PROGRESS NOTES
[address]   Progress Note and Procedure Note      Jean-Pierre Ramos RECORD NUMBER:  [id number]  AGE: 26 y.o. GENDER: male  : 1990  EPISODE DATE:  2020    Subjective:     Chief Complaint   Patient presents with    Wound Check     right bka, vac in place         HISTORY of PRESENT ILLNESS HPI     Juan Manuel Carmichael is a 26 y.o. male who presents today for wound/ulcer evaluation. Wound Context: Pt with R BKA with vac therapy here for eval/treat  Wound/Ulcer Pain Timing/Severity: none  Quality of pain: N/A  Severity:  0 / 10   Modifying Factors: None  Associated Signs/Symptoms: edema    Ulcer Identification:  Ulcer Type: non-healing surgical  Contributing Factors: smoking    Wound: surgical        PAST MEDICAL HISTORY    History reviewed. No pertinent past medical history. PAST SURGICAL HISTORY    Past Surgical History:   Procedure Laterality Date    BACK SURGERY  2019    Laminectomy    FACIAL RECONSTRUCTION SURGERY  2007    FASCIOTOMY  2020    LEG AMPUTATION BELOW KNEE Right 2020    VASCULAR SURGERY         FAMILY HISTORY    Family History   Problem Relation Age of Onset    Diabetes Paternal Grandmother        SOCIAL HISTORY    Social History     Tobacco Use    Smoking status: Never Smoker    Smokeless tobacco: Current User   Substance Use Topics    Alcohol use: Never     Frequency: Never    Drug use: Never       ALLERGIES    No Known Allergies    MEDICATIONS    Current Outpatient Medications on File Prior to Encounter   Medication Sig Dispense Refill    gabapentin (NEURONTIN) 300 MG capsule Take 300 mg by mouth 3 times daily.       WARFARIN SODIUM PO Take 5 mg by mouth nightly       methocarbamol (ROBAXIN) 750 MG tablet Take 750 mg by mouth 4 times daily      acetaminophen (TYLENOL) 500 MG tablet Take 1,000 mg by mouth every 8 hours as needed for Pain      ibuprofen (ADVIL;MOTRIN) 400 MG tablet Take 400 mg by mouth every 8 hours as needed for Width (cm) 10.8 cm 08/12/20 0928   Wound Depth (cm) 1.9 cm 08/12/20 0928   Wound Surface Area (cm^2) 151.2 cm^2 08/12/20 0928   Change in Wound Size % (l*w) 39.03 08/12/20 0928   Wound Volume (cm^3) 287.28 cm^3 08/12/20 0928   Wound Healing % -16 08/12/20 0928   Post-Procedure Length (cm) 14 cm 08/12/20 0939   Post-Procedure Width (cm) 10 cm 08/12/20 0939   Post-Procedure Depth (cm) 1.9 cm 08/12/20 0939   Post-Procedure Surface Area (cm^2) 140 cm^2 08/12/20 0939   Post-Procedure Volume (cm^3) 266 cm^3 08/12/20 0939   Distance Tunneling (cm) 0 cm 08/12/20 0928   Tunneling Position ___ O'Clock 0 08/12/20 0928   Undermining Starts ___ O'Clock 0 08/12/20 0928   Undermining Ends___ O'Clock 0 08/12/20 0928   Undermining Maxium Distance (cm) 0 08/12/20 0928   Wound Assessment Granulation tissue;Pink;Red;Slough; Yellow 08/12/20 0928   Drainage Amount Moderate 08/12/20 0928   Drainage Description Serosanguinous 08/12/20 0928   Odor None 08/12/20 0928   Margins Attached edges 08/12/20 0928   Exposed structure Fascia 08/12/20 0928   Josy-wound Assessment Dry; Intact 08/12/20 0928   Non-staged Wound Description Not applicable 38/96/44 8527   [place]%Wound Bed 10 08/12/20 0928   Red%Wound Bed 80 08/12/20 0928   Yellow%Wound Bed 10 08/12/20 0928   Black%Wound Bed 0 08/12/20 0928   Purple%Wound Bed 0 08/12/20 0928   Other%Wound Bed 0 08/12/20 0928   Number of days: 21             Estimated Blood Loss:  Minimal    Hemostasis Achieved:  by pressure and by silver nitrate stick    Procedural Pain:  0  / 10     Post Procedural Pain:  0 / 10     Response to treatment:  Well tolerated by patient.          Plan:     Problem List Items Addressed This Visit     * (Principal) Non-pressure ulcer of stump of below knee amputation of right lower extremity with fat layer exposed ([facility] [address] - Primary    Relevant Orders    Supply: Wound Cleanser    History of arterial occlusion    Tobacco use    Relevant Orders    Supply: Wound Cleanser    Status post

## 2020-08-24 ENCOUNTER — HOSPITAL ENCOUNTER (OUTPATIENT)
Dept: WOUND CARE | Age: 30
Discharge: HOME | End: 2020-08-24
Payer: COMMERCIAL

## 2020-08-24 ENCOUNTER — HOSPITAL ENCOUNTER (EMERGENCY)
Age: 30
Discharge: HOME | End: 2020-08-25
Payer: COMMERCIAL

## 2020-08-24 VITALS
DIASTOLIC BLOOD PRESSURE: 87 MMHG | SYSTOLIC BLOOD PRESSURE: 133 MMHG | HEART RATE: 96 BPM | RESPIRATION RATE: 18 BRPM | TEMPERATURE: 96 F | HEIGHT: 73 IN | BODY MASS INDEX: 34.72 KG/M2 | WEIGHT: 262 LBS

## 2020-08-24 LAB
ALBUMIN SERPL-MCNC: 3.6 G/DL (ref 3.5–5.2)
ALP BLD-CCNC: 144 U/L (ref 40–130)
ALT SERPL-CCNC: 75 U/L (ref 5–41)
ANION GAP SERPL CALCULATED.3IONS-SCNC: 14 MMOL/L (ref 7–19)
AST SERPL-CCNC: 99 U/L (ref 5–40)
BASOPHILS ABSOLUTE: 0 K/UL (ref 0–0.2)
BASOPHILS RELATIVE PERCENT: 0.3 % (ref 0–1)
BILIRUB SERPL-MCNC: 0.5 MG/DL (ref 0.2–1.2)
BUN BLDV-MCNC: 11 MG/DL (ref 6–20)
CALCIUM SERPL-MCNC: 9.2 MG/DL (ref 8.6–10)
CHLORIDE BLD-SCNC: 97 MMOL/L (ref 98–111)
CO2: 24 MMOL/L (ref 22–29)
CREAT SERPL-MCNC: 0.7 MG/DL (ref 0.5–1.2)
EOSINOPHILS ABSOLUTE: 0.1 K/UL (ref 0–0.6)
EOSINOPHILS RELATIVE PERCENT: 0.8 % (ref 0–5)
GFR AFRICAN AMERICAN: >59
GFR NON-AFRICAN AMERICAN: >60
GLUCOSE BLD-MCNC: 112 MG/DL (ref 74–109)
HCT VFR BLD CALC: 36.1 % (ref 42–52)
HEMOGLOBIN: 11.9 G/DL (ref 14–18)
IMMATURE GRANULOCYTES #: 0 K/UL
INR BLD: 1.46 (ref 0.88–1.18)
LYMPHOCYTES ABSOLUTE: 0.4 K/UL (ref 1.1–4.5)
LYMPHOCYTES RELATIVE PERCENT: 5.7 % (ref 20–40)
MCH RBC QN AUTO: 29.1 PG (ref 27–31)
MCHC RBC AUTO-ENTMCNC: 33 G/DL (ref 33–37)
MCV RBC AUTO: 88.3 FL (ref 80–94)
MONOCYTES ABSOLUTE: 0.7 K/UL (ref 0–0.9)
MONOCYTES RELATIVE PERCENT: 8.9 % (ref 0–10)
NEUTROPHILS ABSOLUTE: 6.5 K/UL (ref 1.5–7.5)
NEUTROPHILS RELATIVE PERCENT: 83.9 % (ref 50–65)
PDW BLD-RTO: 13.6 % (ref 11.5–14.5)
PLATELET # BLD: 223 K/UL (ref 130–400)
PMV BLD AUTO: 9.8 FL (ref 9.4–12.4)
POTASSIUM REFLEX MAGNESIUM: 4.5 MMOL/L (ref 3.5–5)
PRO-BNP: 260 PG/ML (ref 0–450)
PROTHROMBIN TIME: 17.8 SEC (ref 12–14.6)
RBC # BLD: 4.09 M/UL (ref 4.7–6.1)
SODIUM BLD-SCNC: 135 MMOL/L (ref 136–145)
TOTAL PROTEIN: 6.9 G/DL (ref 6.6–8.7)
WBC # BLD: 7.7 K/UL (ref 4.8–10.8)

## 2020-08-24 PROCEDURE — 11042 DBRDMT SUBQ TIS 1ST 20SQCM/<: CPT

## 2020-08-24 PROCEDURE — 96374 THER/PROPH/DIAG INJ IV PUSH: CPT

## 2020-08-24 PROCEDURE — 11045 DBRDMT SUBQ TISS EACH ADDL: CPT | Performed by: SURGERY

## 2020-08-24 PROCEDURE — 96376 TX/PRO/DX INJ SAME DRUG ADON: CPT

## 2020-08-24 PROCEDURE — 11045 DBRDMT SUBQ TISS EACH ADDL: CPT

## 2020-08-24 PROCEDURE — 93971 EXTREMITY STUDY: CPT

## 2020-08-24 PROCEDURE — 99282 EMERGENCY DEPT VISIT SF MDM: CPT

## 2020-08-24 PROCEDURE — 11042 DBRDMT SUBQ TIS 1ST 20SQCM/<: CPT | Performed by: SURGERY

## 2020-08-24 PROCEDURE — 36415 COLL VENOUS BLD VENIPUNCTURE: CPT

## 2020-08-24 PROCEDURE — 80053 COMPREHEN METABOLIC PANEL: CPT

## 2020-08-24 PROCEDURE — 85025 COMPLETE CBC W/AUTO DIFF WBC: CPT

## 2020-08-24 PROCEDURE — 6360000002 HC RX W HCPCS: Performed by: NURSE PRACTITIONER

## 2020-08-24 PROCEDURE — 96375 TX/PRO/DX INJ NEW DRUG ADDON: CPT

## 2020-08-24 PROCEDURE — 85610 PROTHROMBIN TIME: CPT

## 2020-08-24 PROCEDURE — 83880 ASSAY OF NATRIURETIC PEPTIDE: CPT

## 2020-08-24 RX ORDER — CLOBETASOL PROPIONATE 0.5 MG/G
OINTMENT TOPICAL ONCE
Status: CANCELLED | OUTPATIENT
Start: 2020-08-24

## 2020-08-24 RX ORDER — CEPHALEXIN 500 MG/1
500 CAPSULE ORAL 3 TIMES DAILY
COMMUNITY

## 2020-08-24 RX ORDER — LIDOCAINE HYDROCHLORIDE 40 MG/ML
SOLUTION TOPICAL ONCE
Status: CANCELLED | OUTPATIENT
Start: 2020-08-24

## 2020-08-24 RX ORDER — HYDROMORPHONE HYDROCHLORIDE 1 MG/ML
1 INJECTION, SOLUTION INTRAMUSCULAR; INTRAVENOUS; SUBCUTANEOUS ONCE
Status: COMPLETED | OUTPATIENT
Start: 2020-08-24 | End: 2020-08-24

## 2020-08-24 RX ORDER — GINSENG 100 MG
CAPSULE ORAL ONCE
Status: CANCELLED | OUTPATIENT
Start: 2020-08-24

## 2020-08-24 RX ORDER — BACITRACIN ZINC AND POLYMYXIN B SULFATE 500; 1000 [USP'U]/G; [USP'U]/G
OINTMENT TOPICAL ONCE
Status: CANCELLED | OUTPATIENT
Start: 2020-08-24

## 2020-08-24 RX ORDER — GENTAMICIN SULFATE 1 MG/G
OINTMENT TOPICAL ONCE
Status: CANCELLED | OUTPATIENT
Start: 2020-08-24

## 2020-08-24 RX ORDER — HYDROMORPHONE HYDROCHLORIDE 1 MG/ML
0.5 INJECTION, SOLUTION INTRAMUSCULAR; INTRAVENOUS; SUBCUTANEOUS ONCE
Status: COMPLETED | OUTPATIENT
Start: 2020-08-24 | End: 2020-08-24

## 2020-08-24 RX ORDER — BACITRACIN, NEOMYCIN, POLYMYXIN B 400; 3.5; 5 [USP'U]/G; MG/G; [USP'U]/G
OINTMENT TOPICAL ONCE
Status: CANCELLED | OUTPATIENT
Start: 2020-08-24

## 2020-08-24 RX ORDER — ORPHENADRINE CITRATE 30 MG/ML
60 INJECTION INTRAMUSCULAR; INTRAVENOUS ONCE
Status: COMPLETED | OUTPATIENT
Start: 2020-08-24 | End: 2020-08-24

## 2020-08-24 RX ORDER — TRAMADOL HYDROCHLORIDE 50 MG/1
50 TABLET ORAL EVERY 6 HOURS PRN
Qty: 12 TABLET | Refills: 0 | Status: SHIPPED | OUTPATIENT
Start: 2020-08-24 | End: 2020-08-27

## 2020-08-24 RX ORDER — BETAMETHASONE DIPROPIONATE 0.05 %
OINTMENT (GRAM) TOPICAL ONCE
Status: CANCELLED | OUTPATIENT
Start: 2020-08-24

## 2020-08-24 RX ADMIN — HYDROMORPHONE HYDROCHLORIDE 1 MG: 1 INJECTION, SOLUTION INTRAMUSCULAR; INTRAVENOUS; SUBCUTANEOUS at 21:08

## 2020-08-24 RX ADMIN — ORPHENADRINE CITRATE 60 MG: 60 INJECTION INTRAMUSCULAR; INTRAVENOUS at 21:08

## 2020-08-24 RX ADMIN — HYDROMORPHONE HYDROCHLORIDE 0.5 MG: 1 INJECTION, SOLUTION INTRAMUSCULAR; INTRAVENOUS; SUBCUTANEOUS at 21:42

## 2020-08-24 ASSESSMENT — PAIN SCALES - GENERAL
PAINLEVEL_OUTOF10: 8
PAINLEVEL_OUTOF10: 9

## 2020-08-24 ASSESSMENT — PAIN DESCRIPTION - LOCATION: LOCATION: LEG

## 2020-08-24 ASSESSMENT — PAIN DESCRIPTION - ORIENTATION: ORIENTATION: RIGHT

## 2020-08-24 ASSESSMENT — PAIN DESCRIPTION - DESCRIPTORS: DESCRIPTORS: SORE;TENDER

## 2020-08-24 ASSESSMENT — PAIN DESCRIPTION - FREQUENCY: FREQUENCY: CONTINUOUS

## 2020-08-24 ASSESSMENT — PAIN DESCRIPTION - ONSET: ONSET: ON-GOING

## 2020-08-24 ASSESSMENT — PAIN DESCRIPTION - PAIN TYPE: TYPE: ACUTE PAIN

## 2020-08-24 ASSESSMENT — PAIN DESCRIPTION - PROGRESSION: CLINICAL_PROGRESSION: NOT CHANGED

## 2020-08-24 NOTE — PROGRESS NOTES
[address]   Progress Note and Procedure Note      Ernestine Briones RECORD NUMBER:  [id number]  AGE: 26 y.o. GENDER: male  : 1990  EPISODE DATE:  2020    Subjective:     Chief Complaint   Patient presents with    Wound Check     follow up         HISTORY of PRESENT ILLNESS HPI     Mindi Barger is a 26 y.o. male who presents today for wound/ulcer evaluation. Wound Context: Pt with R BKA with vac here for eval/treat  Wound/Ulcer Pain Timing/Severity: none  Quality of pain: N/A  Severity:  8 / 10   Modifying Factors: None  Associated Signs/Symptoms: edema    Ulcer Identification:  Ulcer Type: non-healing surgical  Contributing Factors: edema and arterial insufficiency    Wound: surgical        PAST MEDICAL HISTORY    History reviewed. No pertinent past medical history. PAST SURGICAL HISTORY    Past Surgical History:   Procedure Laterality Date    BACK SURGERY  2019    Laminectomy    FACIAL RECONSTRUCTION SURGERY  2007    FASCIOTOMY  2020    LEG AMPUTATION BELOW KNEE Right 2020    VASCULAR SURGERY         FAMILY HISTORY    Family History   Problem Relation Age of Onset    Diabetes Paternal Grandmother        SOCIAL HISTORY    Social History     Tobacco Use    Smoking status: Never Smoker    Smokeless tobacco: Current User   Substance Use Topics    Alcohol use: Never     Frequency: Never    Drug use: Never       ALLERGIES    No Known Allergies    MEDICATIONS    Current Outpatient Medications on File Prior to Encounter   Medication Sig Dispense Refill    cephALEXin (KEFLEX) 500 MG capsule Take 500 mg by mouth 3 times daily      gabapentin (NEURONTIN) 300 MG capsule Take 300 mg by mouth 3 times daily.       WARFARIN SODIUM PO Take 5 mg by mouth nightly       methocarbamol (ROBAXIN) 750 MG tablet Take 750 mg by mouth 4 times daily      acetaminophen (TYLENOL) 500 MG tablet Take 1,000 mg by mouth every 8 hours as needed for Pain      ibuprofen (ADVIL;MOTRIN) 400 MG tablet Take 400 mg by mouth every 8 hours as needed for Pain      DULoxetine (CYMBALTA) 20 MG extended release capsule Take 60 mg by mouth nightly       memantine (NAMENDA) 5 MG tablet Take 5 mg by mouth 2 times daily X 10 days then stop      Lidocaine HCl 4 % LIQD Apply 5 mLs topically three times a week 73 mL 3    sodium hypochlorite (DAKINS) 0.125 % SOLN external solution Moisten gauze apply to wound twice daily 1 Bottle 3     No current facility-administered medications on file prior to encounter. REVIEW OF SYSTEMS    A comprehensive review of systems was negative.     Objective:      /87   Pulse 96   Temp 96 °F (35.6 °C) (Temporal)   Resp 18   Ht 6' 1\" (1.854 m)   Wt 262 lb (118.8 kg)   BMI 34.57 kg/m²     Wt Readings from Last 3 Encounters:   08/24/20 262 lb (118.8 kg)   08/12/20 262 lb (118.8 kg)   08/05/20 262 lb (118.8 kg)       PHYSICAL EXAM    General Appearance: alert and oriented to person, place and time, well developed and well- nourished, in no acute distress  Skin: warm and dry, no rash or erythema  Head: normocephalic and atraumatic  Eyes: pupils equal, round, and reactive to light, extraocular eye movements intact, conjunctivae normal  ENT: tympanic membrane, external ear and ear canal normal bilaterally, nose without deformity, nasal mucosa and turbinates normal without polyps, lips teeth and gums normal  Neck: supple and non-tender without mass, no thyromegaly or thyroid nodules, no cervical lymphadenopathy  Pulmonary/Chest: clear to auscultation bilaterally- no wheezes, rales or rhonchi, normal air movement, no respiratory distress  Cardiovascular: normal rate, regular rhythm, normal S1 and S2, no murmurs, rubs, clicks, or gallops, distal pulses intact, no carotid bruits  Abdomen: soft, non-tender, non-distended, normal bowel sounds, no masses or organomegaly  Extremities: no cyanosis, clubbing or edema  Musculoskeletal: normal range of motion, no joint swelling, deformity or tenderness  Neurologic: reflexes normal and symmetric, no cranial nerve deficit, gait, coordination and speech normal, sensation of skin normal  s/p R BKA      Assessment:      Problem List Items Addressed This Visit     * (Principal) Non-pressure ulcer of stump of below knee amputation of right lower extremity with fat layer exposed ([facility] [address]    Tobacco use    Status post unilateral below knee amputation, right ([address]           Procedure Note  Indications:  Based on my examination of this patient's wound(s)/ulcer(s) today, debridement is required to promote healing and evaluate the wound base. Performed by: Sunshine Lucero MD    Consent obtained:  Yes    Time out taken:  Yes    Pain Control:         Debridement:Excisional Debridement    Using curette the wound(s)/ulcer(s) was/were sharply debrided down through and including the removal of epidermis, dermis and subcutaneous tissue. Devitalized Tissue Debrided:  fibrin, biofilm, slough, necrotic/eschar and exudate      Pre Debridement Measurements:  Are located in the Wound/Ulcer Documentation Flow Sheet    Wound/Ulcer #: 1    Percent of Wound(s)/Ulcer(s) Debrided: 100%    Total Surface Area Debrided:  152 sq cm       Diabetic/Pressure/Non Pressure Ulcers only:  Ulcer: Non-Pressure ulcer, fat layer exposed           Post Debridement Measurements:    Wound/Ulcer Descriptions are Pre Debridement --EXCEPT MEASUREMENTS    Wound 07/22/20 Leg Right Right BKA (Active)   Wound Image   08/24/20 0946   Wound Non-Healing Surgical 08/24/20 0946   Offloading for Diabetic Foot Ulcers Wheelchair 08/24/20 0946   Dressing Status Old drainage 08/24/20 0946   Dressing Changed Changed/New 08/05/20 1033   Dressing/Treatment Vacuum dressing; Ace wrap 08/05/20 1033   Wound Cleansed Rinsed/Irrigated with saline 08/24/20 0946   Wound Length (cm) 14.5 cm 08/24/20 0946   Wound Width (cm) 10.5 cm 08/24/20 0946   Wound Depth (cm) 0.5 cm 08/24/20 0946   Wound Surface Area (cm^2) 152.25 cm^2 08/24/20 0946   Change in Wound Size % (l*w) 38.61 08/24/20 0946   Wound Volume (cm^3) 76.12 cm^3 08/24/20 0946   Wound Healing % 69 08/24/20 0946   Post-Procedure Length (cm) 14.5 cm 08/24/20 0949   Post-Procedure Width (cm) 10.5 cm 08/24/20 0949   Post-Procedure Depth (cm) 0.5 cm 08/24/20 0949   Post-Procedure Surface Area (cm^2) 152.25 cm^2 08/24/20 0949   Post-Procedure Volume (cm^3) 76.12 cm^3 08/24/20 0949   Distance Tunneling (cm) 0 cm 08/24/20 0946   Tunneling Position ___ O'Clock 0 08/24/20 0946   Undermining Starts ___ O'Clock 0 08/24/20 0946   Undermining Ends___ O'Clock 0 08/24/20 0946   Undermining Maxium Distance (cm) 0 08/24/20 0946   Wound Assessment Granulation tissue;Pink;Red;Slough; Yellow 08/24/20 0946   Drainage Amount Moderate 08/24/20 0946   Drainage Description Serosanguinous 08/24/20 0946   Odor None 08/24/20 0946   Margins Attached edges 08/24/20 0946   Exposed structure Fascia 08/24/20 0946   Josy-wound Assessment Dry; Intact 08/24/20 0946   Non-staged Wound Description Not applicable 49/00/85 3252   [place]%Wound Bed 10 08/24/20 0946   Red%Wound Bed 80 08/24/20 0946   Yellow%Wound Bed 10 08/24/20 0946   Black%Wound Bed 0 08/24/20 0946   Purple%Wound Bed 0 08/24/20 0946   Other%Wound Bed 0 08/12/20 0928   Number of days: 33             Estimated Blood Loss:  Minimal    Hemostasis Achieved:  by pressure    Procedural Pain:  2  / 10     Post Procedural Pain:  1 / 10     Response to treatment:  Well tolerated by patient.          Plan:     Problem List Items Addressed This Visit     * (Principal) Non-pressure ulcer of stump of below knee amputation of right lower extremity with fat layer exposed ([address]    Tobacco use    Status post unilateral below knee amputation, right ([facility])          Pt needs to be [facility] for FU and will likely require revision of BKA    Treatment Note please see attached Discharge Instructions    In my professional opinion this patient would benefit from HBO Therapy: No    Written patient dismissal instructions given to patient and signed by patient or POA. Discharge [address] and Hyperbaric Oxygen Therapy   Physician Orders and Discharge Instructions  [address]  Telephone: [phone]    NAME:  Graham Aguirre  YOB: 1990  MEDICAL RECORD NUMBER:  [id number]  DATE:  8/24/2020    Discharge condition: Stable    Discharge to: Home    Left via:Private automobile    Accompanied by: [facility]    Dressing Orders: May apply 5-10 mL Lidocaine 4% topical into wound vac sponge or tubing and allow the solution to  rest for 5-10 minutes prior to vac removal.    Right BKA:  Wash with soap and water  Apply wound vac as follows: Apply Skin Prep to periwound. Apply drape - window pane to surrounding area  (periwound). May use duoderm instead of drape to prevent skin breakdown. If skin becomes red due to tape irritation please apply skin prep. Then apply light dusting of ostomy powder or  antifungal powder to periwound. Then blot with skin prep to form crusting to protect skin. May repeat skin prep,  powdering steps until proper crusting is made. Then apply drape to periwound. DO NOT PUT FOAM ON GOOD SKIN. Apply black foam to wound bed. BRIDGE above wound site. Set wound vac to  125 mmHG, continuous. Change wound vac dressing 3 times per week (MWF or TTS)  Reapply vac dressing if vac stops working or dressing begins to leak or cannot be reinforced. Apply 6\" ACE Wrap after application of wound vac. Alternative dressing: Wash with soap and water. Apply normal saline moistened gauze to wound bed. Cover with gauze. Secure with medipore tape. Change twice daily . Treatment Orders: Protein rich diet (unless restricted by your physician); Multivitamin daily;  Elevate legs when sitting    [facility] followup visit ______________1 week_______________  (Please note your next appointment above and if you are unable to keep, kindly give a 24 hour notice. Thank you.)    If you experience any of the following, please call the [facility] during business hours:    * Increase in Pain  * Temperature over 101  * Increase in drainage from your wound  * Drainage with a foul odor  * Bleeding  * Increase in swelling  * Need for compression bandage changes due to slippage, breakthrough drainage. If you need medical attention outside of the business hours of the [facility] please contact your PCP or go to the nearest emergency room.         Electronically signed by Gurdeep Grande MD on 8/24/2020 at 9:53 AM

## 2020-08-25 VITALS
DIASTOLIC BLOOD PRESSURE: 67 MMHG | TEMPERATURE: 98.2 F | HEART RATE: 87 BPM | WEIGHT: 260 LBS | RESPIRATION RATE: 18 BRPM | OXYGEN SATURATION: 93 % | HEIGHT: 73 IN | SYSTOLIC BLOOD PRESSURE: 144 MMHG | BODY MASS INDEX: 34.46 KG/M2

## 2020-08-25 ASSESSMENT — ENCOUNTER SYMPTOMS: VOMITING: 0

## 2020-08-25 NOTE — ED NOTES
Right stump dressed with wet to dry dressing, covered with 4x4 and kerlex.  Pt tolerated well     Eagle Chiu RN  08/25/20 8677

## 2020-08-25 NOTE — PROGRESS NOTES
Vascular preliminary results. Left lower extremity venous duplex scan performed today. No evidence of DVT, SVT, or reflux noted at this time. Final report pending.

## 2020-08-25 NOTE — ED PROVIDER NOTES
[address] [facility] EMER[facility] DEPT  eMERGENCY dEPARTMENT eNCOUnter      Pt Name: Catherine Buck  MRN: [id number]  [place] 1990  Date of evaluation: 8/24/2020  Provider: Daniella Fulton, [address]       Chief Complaint   Patient presents with    Leg Pain     pt presents to ED with c/o right leg post of pain from BKA. pt has swelling in left leg.  Leg Swelling         HISTORY OF PRESENT ILLNESS   (Location/Symptom, Timing/Onset,Context/Setting, Quality, Duration, Modifying Factors, Severity)  Note limiting factors. Catherine Buck is a 26 y.o. male who presents to the emergency department with left leg swelling. He had a right below the knee amputation done in July because of arterial occlusion. This was done by vascular surgeon [facility]. He is seen by wound care here. He saw wound care today and they briefly debrided the wound  To his right stump. He denies a fever. He has not had any vomiting. He does complain of pain to the right stump. He is not having pain to the left leg  The history is provided by the patient. Leg Pain   This is a new problem. The current episode started 12 to 24 hours ago. The problem occurs constantly. NursingNotes were reviewed. REVIEW OF SYSTEMS    (2-9 systems for level 4, 10 or more for level 5)     Review of Systems   Constitutional: Negative for fever. Cardiovascular: Positive for leg swelling. Gastrointestinal: Negative for vomiting. Musculoskeletal: Positive for arthralgias. Skin: Positive for wound. Except as noted above the remainder of the review of systems was reviewed and negative. PAST MEDICAL HISTORY   No past medical history on file.       SURGICALHISTORY       Past Surgical History:   Procedure Laterality Date    BACK SURGERY  02/2019    Laminectomy    FACIAL RECONSTRUCTION SURGERY  2007    FASCIOTOMY  07/11/2020    LEG AMPUTATION BELOW KNEE Right 07/13/2020    VASCULAR SURGERY           CURRENT MEDICATIONS       Discharge Medication List as of 8/24/2020 11:33 PM      CONTINUE these medications which have NOT CHANGED    Details   cephALEXin (KEFLEX) 500 MG capsule Take 500 mg by mouth 3 times dailyHistorical Med      sodium hypochlorite (DAKINS) 0.125 % SOLN external solution Moisten gauze apply to wound twice daily, Disp-1 Bottle,R-3, Normal      gabapentin (NEURONTIN) 300 MG capsule Take 300 mg by mouth 3 times daily. Historical Med      WARFARIN SODIUM PO Take 5 mg by mouth nightly Historical Med      methocarbamol (ROBAXIN) 750 MG tablet Take 750 mg by mouth 4 times dailyHistorical Med      acetaminophen (TYLENOL) 500 MG tablet Take 1,000 mg by mouth every 8 hours as needed for PainHistorical Med      ibuprofen (ADVIL;MOTRIN) 400 MG tablet Take 400 mg by mouth every 8 hours as needed for PainHistorical Med      DULoxetine (CYMBALTA) 20 MG extended release capsule Take 60 mg by mouth nightly Historical Med      memantine (NAMENDA) 5 MG tablet Take 5 mg by mouth 2 times daily X 10 days then stopHistorical Med      Lidocaine HCl 4 % LIQD Apply 5 mLs topically three times a week, Disp-73 mL,R-3Normal             ALLERGIES     Patient has no known allergies.     FAMILY HISTORY       Family History   Problem Relation Age of Onset    Diabetes Paternal Grandmother           SOCIAL HISTORY       Social History     Socioeconomic History    Marital status: Single     Spouse name: Not on file    Number of children: Not on file    Years of education: Not on file    Highest education level: Not on file   Occupational History    Not on file   Social Needs    Financial resource strain: Not on file    Food insecurity     Worry: Not on file     Inability: Not on file    Transportation needs     Medical: Not on file     Non-medical: Not on file   Tobacco Use    Smoking status: Never Smoker    Smokeless tobacco: Current User   Substance and Sexual Activity    Alcohol use: Never     Frequency: Never    Drug use: Never    Sexual activity: Not on file   Lifestyle    Physical activity     Days per week: Not on file     Minutes per session: Not on file    Stress: Not on file   Relationships    Social connections     Talks on phone: Not on file     Gets together: Not on file     Attends Congregation service: Not on file     Active member of club or organization: Not on file     Attends meetings of clubs or organizations: Not on file     Relationship status: Not on file    Intimate partner violence     Fear of current or ex partner: Not on file     Emotionally abused: Not on file     Physically abused: Not on file     Forced sexual activity: Not on file   Other Topics Concern    Not on file   Social History Narrative    Not on file       SCREENINGS      @SXK([id number])@      PHYSICAL EXAM    (up to 7 for level 4, 8 or more for level 5)     ED Triage Vitals [20]   BP Temp Temp Source Pulse Resp SpO2 Height Weight   (!) 154/97 98.2 °F (36.8 °C) Oral 111 16 96 % 6' 1\" (1.854 m) 260 lb (117.9 kg)       Physical Exam  Vitals signs and nursing note reviewed. Constitutional:       Appearance: He is well-developed. HENT:      Head: Normocephalic and atraumatic. Eyes:      General: No scleral icterus. Right eye: No discharge. Left eye: No discharge. Neck:      Musculoskeletal: Normal range of motion and neck supple. Cardiovascular:      Rate and Rhythm: Normal rate. Pulses:           Dorsalis pedis pulses are 2+ on the left side. Comments: Foot is warm with pulses present  See nurses notes  Pulmonary:      Effort: No respiratory distress. Musculoskeletal:      Left lower le+ Edema present. Skin:     General: Skin is warm and dry. Comments: Flap with good granulation tissue present   Neurological:      Mental Status: He is alert.    Psychiatric:         Behavior: Behavior normal.         DIAGNOSTIC RESULTS     EKG: All EKG's are interpreted by the Emergency Department Physician who either signs or Co-signsthis chart in the absence of a cardiologist.        RADIOLOGY:   Birder Drivers such as CT, Ultrasound and MRI are read by the radiologist. Plain radiographic images are visualized and preliminarily interpreted by the emergency physician with the below findings:      Interpretation per the Radiologist below, if available at the time of this note:    VL Extremity Venous Left         neg      ED BEDSIDEULTRASOUND:   Performed by ED Physician -none    LABS:  Labs Reviewed   CBC WITH AUTO DIFFERENTIAL - Abnormal; Notable for the following components:       Result Value    RBC 4.09 (*)     Hemoglobin 11.9 (*)     Hematocrit 36.1 (*)     Neutrophils % 83.9 (*)     Lymphocytes % 5.7 (*)     Lymphocytes Absolute 0.4 (*)     All other components within normal limits   COMPREHENSIVE METABOLIC PANEL W/ REFLEX TO MG FOR LOW K - Abnormal; Notable for the following components:    Sodium 135 (*)     Chloride 97 (*)     Glucose 112 (*)     Alkaline Phosphatase 144 (*)     ALT 75 (*)     AST 99 (*)     All other components within normal limits   PROTIME-INR - Abnormal; Notable for the following components:    Protime 17.8 (*)     INR 1.46 (*)     All other components within normal limits   BRAIN NATRIURETIC PEPTIDE       All other labs were within normal range or not returned as of this dictation. EMERGENCY DEPARTMENT COURSE and DIFFERENTIALDIAGNOSIS/MDM:   Vitals:    Vitals:    08/24/20 1958 08/24/20 2200 08/25/20 0011   BP: (!) 154/97 (!) 142/71 (!) 144/67   Pulse: 111 85 87   Resp: 16 14 18   Temp: 98.2 °F (36.8 °C)     TempSrc: Oral     SpO2: 96% 93%    Weight: 260 lb (117.9 kg)     Height: 6' 1\" (1.854 m)             MDM patient's pain medication is being cut down. He last received  10 Ultram 5 days ago. . Patient counseled that he does need to cut down his pain medication and only take it when absolutely necessary. His INR is subtherapeutic.   They will call the physician that takes care of his INR with this information tomorrow. His Coumadin dose is recently been increased. CONSULTS:  None    PROCEDURES:  Unless otherwise noted below, none     Procedures    FINAL IMPRESSION      1. Left leg swelling    2. History of right below knee amputation ([address]    3. Subtherapeutic international normalized ratio (INR)    4. History of arterial occlusion    5. Status post unilateral below knee amputation, right [facility])        DISPOSITION/PLAN   DISPOSITION Decision To Discharge 08/24/2020 10:56:35 PM      PATIENT REFERRED TO:  No follow-up provider specified. DISCHARGE MEDICATIONS:  Discharge Medication List as of 8/24/2020 11:33 PM      START taking these medications    Details   traMADol (ULTRAM) 50 MG tablet Take 1 tablet by mouth every 6 hours as needed for Pain for up to 3 days. Intended supply: 3 days. Take lowest dose possible to manage pain, Disp-12 tablet,R-0Print           Attestation: The Prescription Monitoring Report for this patient was reviewed today. ([id number]) ALONSO Luque)    (Please note that portions of this note were completed with a voice recognitionprogram.  Efforts were made to edit the dictations but occasionally words are mis-transcribed.)    ALONSO Luque (electronically signed)         ALONSO Luque  08/25/20 7251

## 2021-06-25 ENCOUNTER — TREATMENT (OUTPATIENT)
Dept: PHYSICAL THERAPY | Facility: CLINIC | Age: 31
End: 2021-06-25

## 2021-06-25 DIAGNOSIS — Z89.611 HX OF AKA (ABOVE KNEE AMPUTATION), RIGHT (HCC): Primary | ICD-10-CM

## 2021-06-25 PROCEDURE — FCEPTSP: Performed by: OCCUPATIONAL THERAPIST

## 2021-06-25 NOTE — PROGRESS NOTES
Occupational Therapy Functional Capacity Evaluation    Patient: Binh Bowen   : 1990  Referring practitioner: No ref. provider found  Date of Initial Visit: 2021  Today's Date: 2021  Patient seen for 1 sessions    Visit Diagnoses:    ICD-10-CM ICD-9-CM   1. Hx of AKA (above knee amputation), right (CMS/[facility])  Z89.611 V49.76           OBJECTIVE     Objective         FCE completed. See media tab for full report. Self-pay rate. Employer paying for testing.         Total Timed Treatment:     180   mins  Total Time of Visit:            180   mins    ASSESSMENT/PLAN     Assessment/Plan    OT SIGNATURE: Nadiya Philippe OTR/L   DATE TREATMENT INITIATED: 2021

## 2024-05-30 ENCOUNTER — HOSPITAL ENCOUNTER (EMERGENCY)
Age: 34
Discharge: HOME | End: 2024-05-30
Attending: EMERGENCY MEDICINE
Payer: COMMERCIAL

## 2024-05-30 ENCOUNTER — APPOINTMENT (OUTPATIENT)
Dept: CT IMAGING | Age: 34
End: 2024-05-30
Payer: COMMERCIAL

## 2024-05-30 VITALS
DIASTOLIC BLOOD PRESSURE: 90 MMHG | SYSTOLIC BLOOD PRESSURE: 147 MMHG | TEMPERATURE: 98.5 F | HEART RATE: 84 BPM | OXYGEN SATURATION: 96 % | RESPIRATION RATE: 18 BRPM

## 2024-05-30 DIAGNOSIS — K85.20 ALCOHOL-INDUCED ACUTE PANCREATITIS WITHOUT INFECTION OR NECROSIS: Primary | ICD-10-CM

## 2024-05-30 LAB
ALBUMIN SERPL-MCNC: 3.6 G/DL (ref 3.5–5.2)
ALP SERPL-CCNC: 133 U/L (ref 40–130)
ALT SERPL-CCNC: 79 U/L (ref 5–41)
ANION GAP SERPL CALCULATED.3IONS-SCNC: 12 MMOL/L (ref 7–19)
AST SERPL-CCNC: 89 U/L (ref 5–40)
BACTERIA URNS QL MICRO: NEGATIVE /HPF
BASOPHILS # BLD: 0 K/UL (ref 0–0.2)
BASOPHILS NFR BLD: 0.3 % (ref 0–1)
BILIRUB SERPL-MCNC: 0.5 MG/DL (ref 0.2–1.2)
BILIRUB UR QL STRIP: NEGATIVE
BUN SERPL-MCNC: 14 MG/DL (ref 6–20)
CALCIUM SERPL-MCNC: 8.8 MG/DL (ref 8.6–10)
CHLORIDE SERPL-SCNC: 100 MMOL/L (ref 98–111)
CLARITY UR: CLEAR
CO2 SERPL-SCNC: 26 MMOL/L (ref 22–29)
COLOR UR: YELLOW
CREAT SERPL-MCNC: 0.8 MG/DL (ref 0.5–1.2)
CRYSTALS URNS MICRO: NORMAL /HPF
EOSINOPHIL # BLD: 0.1 K/UL (ref 0–0.6)
EOSINOPHIL NFR BLD: 0.6 % (ref 0–5)
EPI CELLS #/AREA URNS AUTO: 0 /HPF (ref 0–5)
ERYTHROCYTE [DISTWIDTH] IN BLOOD BY AUTOMATED COUNT: 14.7 % (ref 11.5–14.5)
GLUCOSE SERPL-MCNC: 106 MG/DL (ref 74–109)
GLUCOSE UR STRIP.AUTO-MCNC: NEGATIVE MG/DL
HCT VFR BLD AUTO: 38.6 % (ref 42–52)
HGB BLD-MCNC: 13.3 G/DL (ref 14–18)
HGB UR STRIP.AUTO-MCNC: ABNORMAL MG/L
HYALINE CASTS #/AREA URNS AUTO: 1 /HPF (ref 0–8)
IMM GRANULOCYTES # BLD: 0.1 K/UL
INR PPP: 1.67 (ref 0.88–1.18)
KETONES UR STRIP.AUTO-MCNC: NEGATIVE MG/DL
LEUKOCYTE ESTERASE UR QL STRIP.AUTO: NEGATIVE
LIPASE SERPL-CCNC: 123 U/L (ref 13–60)
LYMPHOCYTES # BLD: 0.8 K/UL (ref 1.1–4.5)
LYMPHOCYTES NFR BLD: 9.2 % (ref 20–40)
MCH RBC QN AUTO: 34.3 PG (ref 27–31)
MCHC RBC AUTO-ENTMCNC: 34.5 G/DL (ref 33–37)
MCV RBC AUTO: 99.5 FL (ref 80–94)
MONOCYTES # BLD: 0.9 K/UL (ref 0–0.9)
MONOCYTES NFR BLD: 10 % (ref 0–10)
NEUTROPHILS # BLD: 6.8 K/UL (ref 1.5–7.5)
NEUTS SEG NFR BLD: 78.4 % (ref 50–65)
NITRITE UR QL STRIP.AUTO: NEGATIVE
PH UR STRIP.AUTO: 5.5 [PH] (ref 5–8)
PLATELET # BLD AUTO: 142 K/UL (ref 130–400)
PMV BLD AUTO: 9.2 FL (ref 9.4–12.4)
POTASSIUM SERPL-SCNC: 4.4 MMOL/L (ref 3.5–5)
PROT SERPL-MCNC: 6.5 G/DL (ref 6.6–8.7)
PROT UR STRIP.AUTO-MCNC: 30 MG/DL
PROTHROMBIN TIME: 19.3 SEC (ref 12–14.6)
RBC # BLD AUTO: 3.88 M/UL (ref 4.7–6.1)
RBC #/AREA URNS AUTO: 4 /HPF (ref 0–4)
SODIUM SERPL-SCNC: 138 MMOL/L (ref 136–145)
SP GR UR STRIP.AUTO: 1.01 (ref 1–1.03)
UROBILINOGEN UR STRIP.AUTO-MCNC: 0.2 E.U./DL
WBC # BLD AUTO: 8.7 K/UL (ref 4.8–10.8)
WBC #/AREA URNS AUTO: 3 /HPF (ref 0–5)

## 2024-05-30 PROCEDURE — 2580000003 HC RX 258: Performed by: EMERGENCY MEDICINE

## 2024-05-30 PROCEDURE — 6370000000 HC RX 637 (ALT 250 FOR IP): Performed by: EMERGENCY MEDICINE

## 2024-05-30 PROCEDURE — 74177 CT ABD & PELVIS W/CONTRAST: CPT

## 2024-05-30 PROCEDURE — 85025 COMPLETE CBC W/AUTO DIFF WBC: CPT

## 2024-05-30 PROCEDURE — 83690 ASSAY OF LIPASE: CPT

## 2024-05-30 PROCEDURE — 6360000004 HC RX CONTRAST MEDICATION: Performed by: EMERGENCY MEDICINE

## 2024-05-30 PROCEDURE — 96376 TX/PRO/DX INJ SAME DRUG ADON: CPT

## 2024-05-30 PROCEDURE — 6360000002 HC RX W HCPCS: Performed by: EMERGENCY MEDICINE

## 2024-05-30 PROCEDURE — 99285 EMERGENCY DEPT VISIT HI MDM: CPT

## 2024-05-30 PROCEDURE — 96375 TX/PRO/DX INJ NEW DRUG ADDON: CPT

## 2024-05-30 PROCEDURE — 36415 COLL VENOUS BLD VENIPUNCTURE: CPT

## 2024-05-30 PROCEDURE — 80053 COMPREHEN METABOLIC PANEL: CPT

## 2024-05-30 PROCEDURE — 96374 THER/PROPH/DIAG INJ IV PUSH: CPT

## 2024-05-30 PROCEDURE — 81001 URINALYSIS AUTO W/SCOPE: CPT

## 2024-05-30 PROCEDURE — 85610 PROTHROMBIN TIME: CPT

## 2024-05-30 RX ORDER — ONDANSETRON 2 MG/ML
4 INJECTION INTRAMUSCULAR; INTRAVENOUS ONCE
Status: COMPLETED | OUTPATIENT
Start: 2024-05-30 | End: 2024-05-30

## 2024-05-30 RX ORDER — ONDANSETRON 4 MG/1
4 TABLET, FILM COATED ORAL 3 TIMES DAILY PRN
Qty: 15 TABLET | Refills: 0 | Status: SHIPPED | OUTPATIENT
Start: 2024-05-30

## 2024-05-30 RX ORDER — DROPERIDOL 2.5 MG/ML
0.62 INJECTION, SOLUTION INTRAMUSCULAR; INTRAVENOUS ONCE
Status: COMPLETED | OUTPATIENT
Start: 2024-05-30 | End: 2024-05-30

## 2024-05-30 RX ORDER — HYDROMORPHONE HYDROCHLORIDE 1 MG/ML
1 INJECTION, SOLUTION INTRAMUSCULAR; INTRAVENOUS; SUBCUTANEOUS ONCE
Status: COMPLETED | OUTPATIENT
Start: 2024-05-30 | End: 2024-05-30

## 2024-05-30 RX ORDER — SODIUM CHLORIDE, SODIUM LACTATE, POTASSIUM CHLORIDE, AND CALCIUM CHLORIDE .6; .31; .03; .02 G/100ML; G/100ML; G/100ML; G/100ML
1000 INJECTION, SOLUTION INTRAVENOUS ONCE
Status: COMPLETED | OUTPATIENT
Start: 2024-05-30 | End: 2024-05-30

## 2024-05-30 RX ORDER — METOPROLOL TARTRATE 50 MG/1
50 TABLET, FILM COATED ORAL 2 TIMES DAILY
COMMUNITY

## 2024-05-30 RX ORDER — OXYCODONE AND ACETAMINOPHEN 7.5; 325 MG/1; MG/1
1 TABLET ORAL EVERY 6 HOURS PRN
Qty: 12 TABLET | Refills: 0 | Status: SHIPPED | OUTPATIENT
Start: 2024-05-30 | End: 2024-06-02

## 2024-05-30 RX ORDER — CHLORDIAZEPOXIDE HYDROCHLORIDE 10 MG/1
10 CAPSULE, GELATIN COATED ORAL 3 TIMES DAILY PRN
COMMUNITY

## 2024-05-30 RX ORDER — PANTOPRAZOLE SODIUM 40 MG/1
40 TABLET, DELAYED RELEASE ORAL DAILY
Qty: 30 TABLET | Refills: 1 | Status: SHIPPED | OUTPATIENT
Start: 2024-05-30

## 2024-05-30 RX ORDER — BUPROPION HYDROCHLORIDE 150 MG/1
150 TABLET ORAL EVERY MORNING
COMMUNITY
Start: 2021-01-06

## 2024-05-30 RX ADMIN — IOPAMIDOL 70 ML: 755 INJECTION, SOLUTION INTRAVENOUS at 19:46

## 2024-05-30 RX ADMIN — Medication: at 20:40

## 2024-05-30 RX ADMIN — ONDANSETRON 4 MG: 2 INJECTION INTRAMUSCULAR; INTRAVENOUS at 18:05

## 2024-05-30 RX ADMIN — DROPERIDOL 0.62 MG: 2.5 INJECTION, SOLUTION INTRAMUSCULAR; INTRAVENOUS at 21:29

## 2024-05-30 RX ADMIN — HYDROMORPHONE HYDROCHLORIDE 1 MG: 1 INJECTION, SOLUTION INTRAMUSCULAR; INTRAVENOUS; SUBCUTANEOUS at 18:05

## 2024-05-30 RX ADMIN — HYDROMORPHONE HYDROCHLORIDE 1 MG: 1 INJECTION, SOLUTION INTRAMUSCULAR; INTRAVENOUS; SUBCUTANEOUS at 20:44

## 2024-05-30 RX ADMIN — SODIUM CHLORIDE, POTASSIUM CHLORIDE, SODIUM LACTATE AND CALCIUM CHLORIDE 1000 ML: 600; 310; 30; 20 INJECTION, SOLUTION INTRAVENOUS at 18:09

## 2024-05-30 RX ADMIN — HYOSCYAMINE SULFATE 250 MCG: 0.12 TABLET ORAL; SUBLINGUAL at 20:38

## 2024-05-30 ASSESSMENT — PAIN SCALES - GENERAL
PAINLEVEL_OUTOF10: 10
PAINLEVEL_OUTOF10: 9
PAINLEVEL_OUTOF10: 8
PAINLEVEL_OUTOF10: 10

## 2024-05-30 ASSESSMENT — ENCOUNTER SYMPTOMS
COUGH: 0
VOMITING: 0
SHORTNESS OF BREATH: 0
RHINORRHEA: 0
SORE THROAT: 0
NAUSEA: 0
DIARRHEA: 0
BACK PAIN: 0
ABDOMINAL PAIN: 1

## 2024-05-30 ASSESSMENT — PAIN - FUNCTIONAL ASSESSMENT: PAIN_FUNCTIONAL_ASSESSMENT: 0-10

## 2024-05-30 NOTE — ED PROVIDER NOTES
[facility] EMERGENCY DEPT  eMERGENCY dEPARTMENT eNCOUnter      Pt Name: Apolinar Causey  MRN: [id number]  Birthdate 1990  Date of evaluation: 5/30/2024  Provider: JOHNNY HURT MD    CHIEF COMPLAINT       Chief Complaint   Patient presents with   • Abdominal Pain     Pt here with abd pain was recently at [facility] in [place] and dx with pancreatitis          HISTORY OF PRESENT ILLNESS   (Location/Symptom, Timing/Onset,Context/Setting, Quality, Duration, Modifying Factors, Severity)  Note limiting factors.   Apolinar Causey is a 34 y.o. male who presents to the emergency department for epigastric and right upper quadrant abdominal pain.  Patient was just admitted for the past 4 to 5 days at [facility] in [facility] and was just discharged yesterday after being diagnosed with acute pancreatitis suspect likely alcohol induced.  Patient states that since undergoing amputation of his right leg he has been abusing alcohol for the past 4 years and drinks whiskey most days sometimes up to 1/5 a day.  Lost his leg after back surgery complication due to a clot per patient.  He denies any vomiting or diarrhea.  No nsaid use or hx of ulcers.  No fevers or chills.    HPI    NursingNotes were reviewed.    REVIEW OF SYSTEMS    (2-9 systems for level 4, 10 or more for level 5)     Review of Systems   Constitutional:  Negative for chills and fever.   HENT:  Negative for rhinorrhea and sore throat.    Respiratory:  Negative for cough and shortness of breath.    Cardiovascular:  Negative for chest pain.   Gastrointestinal:  Positive for abdominal pain. Negative for diarrhea, nausea and vomiting.   Genitourinary:  Negative for dysuria and frequency.   Musculoskeletal:  Negative for back pain and neck pain.   Neurological:  Negative for dizziness and headaches.   All other systems reviewed and are negative.           PAST MEDICALHISTORY     Past Medical History:   Diagnosis Date   • Below knee amputation (HCC)     right   • Non-healing

## 2024-09-28 DIAGNOSIS — M79.604 RIGHT LEG PAIN: Primary | ICD-10-CM

## 2024-10-15 DIAGNOSIS — M89.8X5 PAIN IN RIGHT FEMUR: Primary | ICD-10-CM

## 2024-10-16 ENCOUNTER — OFFICE VISIT (OUTPATIENT)
Age: 34
End: 2024-10-16
Payer: COMMERCIAL

## 2024-10-16 VITALS — WEIGHT: 315 LBS | BODY MASS INDEX: 40.43 KG/M2 | HEIGHT: 74 IN

## 2024-10-16 DIAGNOSIS — S72.351D CLOSED DISPLACED COMMINUTED FRACTURE OF SHAFT OF RIGHT FEMUR WITH ROUTINE HEALING: ICD-10-CM

## 2024-10-16 DIAGNOSIS — M79.604 RIGHT LEG PAIN: Primary | ICD-10-CM

## 2024-10-16 PROCEDURE — 99213 OFFICE O/P EST LOW 20 MIN: CPT | Performed by: ORTHOPAEDIC SURGERY

## 2024-10-16 RX ORDER — HYDROCODONE BITARTRATE AND ACETAMINOPHEN 5; 325 MG/1; MG/1
1 TABLET ORAL EVERY 4 HOURS PRN
Qty: 40 TABLET | Refills: 0 | Status: SHIPPED | OUTPATIENT
Start: 2024-10-16 | End: 2024-10-26

## 2024-10-16 NOTE — PROGRESS NOTES
mouth nightly       No current facility-administered medications for this visit.        Allergies  No Known Allergies         Objective:   White (non-)   Ht Readings from Last 1 Encounters:   10/16/24 1.88 m (6' 2\")    .[id number]  Body mass index is 40.44 kg/m².     Review of Systems  System  Neg/Pos  Details  Constitutional  Negative  Chills, Fatigue, Fever and Night Sweats  Respiratory  Negative  Chest Pain, Cough and Dyspnea  Cardio   Negative  Leg Swelling  GI   Negative  Abdominal Pain, Constipation, Nausea and Vomiting     Negative  Urinary Incontinence   Endocrine  Negative  Weight Gain and Weight Loss  MS   Negative  Except as noted in HPI and Chief Complaint    Pleasant male in no distress.  His stump he tells me is about the same size it has always been.  It does not look that swollen there is no redness he is nontender about the stump.  His incisions well-healed.         Xray: XR FEMUR RIGHT (MIN 2 VIEWS)  X-rays AP lateral right femur show a comminuted right distal femur   fracture at the tip of his femoral stump that has abundant callus.    Overall good alignment.         Assessment and Plan:     Assessment & Plan  Right leg pain       Orders:    HYDROcodone-acetaminophen (NORCO) 5-325 MG per tablet; Take 1 tablet by mouth every 4 hours as needed for Pain for up to 10 days. Max Daily Amount: 6 tablets    Closed displaced comminuted fracture of shaft of right femur with routine healing                   No follow-ups on file.     Patient is doing well with his right AKA stump femur fracture.  I think it is healed.  With time it will feel better hopefully be able to be fitted for prosthesis within the next month or 2.  He will follow-up with me as needed.          Patient given educational materials - see patient instructions.   Discussed use, benefit, and side effects of prescribed medications.  All patient questions answered.  Pt voiced understanding. Patient agreed with treatment plan.

## 2024-11-11 ENCOUNTER — TELEPHONE (OUTPATIENT)
Age: 34
End: 2024-11-11

## 2024-11-11 DIAGNOSIS — S72.351D CLOSED DISPLACED COMMINUTED FRACTURE OF SHAFT OF RIGHT FEMUR WITH ROUTINE HEALING: Primary | ICD-10-CM

## 2024-11-11 NOTE — TELEPHONE ENCOUNTER
Pt called and said that he needs a order for his new Prosthetic. Pt stated that last time he was here they talked about it and he needs one now.

## 2024-11-14 ENCOUNTER — TELEPHONE (OUTPATIENT)
Age: 34
End: 2024-11-14

## 2024-11-14 NOTE — TELEPHONE ENCOUNTER
[employer] from [facility] is calling on behalf of pt, they are calling for a duplication of therapy. Manuela can be reached at [phone]

## 2024-11-15 NOTE — TELEPHONE ENCOUNTER
Manuela from [employer] called back about this please giver her a call back at [phone]

## 2024-11-25 ENCOUNTER — TELEPHONE (OUTPATIENT)
Age: 34
End: 2024-11-25

## 2024-11-25 NOTE — TELEPHONE ENCOUNTER
Zeferino from the [facility] reached out for clinical question for this Pt. States he has a claim opening with them and was needing to verify questions they dont have answered.

## 2024-12-02 ENCOUNTER — TELEPHONE (OUTPATIENT)
Age: 34
End: 2024-12-02

## 2024-12-02 NOTE — TELEPHONE ENCOUNTER
Talked with Itzel at  [facility].#  [phone]  She need ov 10-16-24  & signed order by Dr. Pittman signed & faxed back.  12/2/24  LB

## 2025-08-26 ENCOUNTER — APPOINTMENT (OUTPATIENT)
Dept: GENERAL RADIOLOGY | Age: 35
End: 2025-08-26

## 2025-08-26 ENCOUNTER — HOSPITAL ENCOUNTER (EMERGENCY)
Age: 35
Discharge: HOME | End: 2025-08-26
Attending: EMERGENCY MEDICINE
Payer: COMMERCIAL

## 2025-08-26 ENCOUNTER — APPOINTMENT (OUTPATIENT)
Dept: CT IMAGING | Age: 35
End: 2025-08-26

## 2025-08-26 VITALS
WEIGHT: 285 LBS | OXYGEN SATURATION: 93 % | SYSTOLIC BLOOD PRESSURE: 107 MMHG | DIASTOLIC BLOOD PRESSURE: 79 MMHG | RESPIRATION RATE: 18 BRPM | TEMPERATURE: 97.8 F | BODY MASS INDEX: 36.57 KG/M2 | HEART RATE: 96 BPM | HEIGHT: 74 IN

## 2025-08-26 DIAGNOSIS — R07.89 ATYPICAL CHEST PAIN: Primary | ICD-10-CM

## 2025-08-26 DIAGNOSIS — R79.1 SUBTHERAPEUTIC INTERNATIONAL NORMALIZED RATIO (INR): ICD-10-CM

## 2025-08-26 LAB
ALBUMIN SERPL-MCNC: 4.1 G/DL (ref 3.5–5.2)
ALP SERPL-CCNC: 104 U/L (ref 40–129)
ALT SERPL-CCNC: 34 U/L (ref 10–50)
ANION GAP SERPL CALCULATED.3IONS-SCNC: 10 MMOL/L (ref 8–16)
AST SERPL-CCNC: 31 U/L (ref 10–50)
BACTERIA URNS QL MICRO: NEGATIVE /HPF
BASOPHILS # BLD: 0.1 K/UL (ref 0–0.2)
BASOPHILS NFR BLD: 0.8 % (ref 0–1)
BILIRUB SERPL-MCNC: 0.8 MG/DL (ref 0.2–1.2)
BILIRUB UR QL STRIP: ABNORMAL
BUN SERPL-MCNC: 8 MG/DL (ref 6–20)
CALCIUM SERPL-MCNC: 9.9 MG/DL (ref 8.6–10)
CHLORIDE SERPL-SCNC: 104 MMOL/L (ref 98–107)
CLARITY UR: CLEAR
CO2 SERPL-SCNC: 25 MMOL/L (ref 22–29)
COLOR UR: ABNORMAL
CREAT SERPL-MCNC: 0.8 MG/DL (ref 0.7–1.2)
CRYSTALS URNS MICRO: ABNORMAL /HPF
D DIMER PPP FEU-MCNC: 1.39 UG/ML FEU (ref 0–0.48)
EKG P AXIS: 31 DEGREES
EKG P-R INTERVAL: 156 MS
EKG Q-T INTERVAL: 370 MS
EKG QRS DURATION: 92 MS
EKG QTC CALCULATION (BAZETT): 396 MS
EKG T AXIS: 15 DEGREES
EOSINOPHIL # BLD: 0.1 K/UL (ref 0–0.6)
EOSINOPHIL NFR BLD: 1 % (ref 0–5)
EPI CELLS #/AREA URNS AUTO: 1 /HPF (ref 0–5)
ERYTHROCYTE [DISTWIDTH] IN BLOOD BY AUTOMATED COUNT: 12.5 % (ref 11.5–14.5)
GLUCOSE SERPL-MCNC: 103 MG/DL (ref 70–99)
GLUCOSE UR STRIP.AUTO-MCNC: NEGATIVE MG/DL
HCT VFR BLD AUTO: 48.6 % (ref 42–52)
HGB BLD-MCNC: 16.2 G/DL (ref 14–18)
HGB UR STRIP.AUTO-MCNC: NEGATIVE MG/L
HYALINE CASTS #/AREA URNS AUTO: 5 /HPF (ref 0–8)
IMM GRANULOCYTES # BLD: 0 K/UL
INR PPP: 1.18 (ref 0.88–1.18)
KETONES UR STRIP.AUTO-MCNC: ABNORMAL MG/DL
LEUKOCYTE ESTERASE UR QL STRIP.AUTO: ABNORMAL
LIPASE SERPL-CCNC: 33 U/L (ref 13–60)
LYMPHOCYTES # BLD: 1.1 K/UL (ref 1.1–4.5)
LYMPHOCYTES NFR BLD: 16 % (ref 20–40)
MCH RBC QN AUTO: 30.5 PG (ref 27–31)
MCHC RBC AUTO-ENTMCNC: 33.3 G/DL (ref 33–37)
MCV RBC AUTO: 91.4 FL (ref 80–94)
MONOCYTES # BLD: 0.8 K/UL (ref 0–0.9)
MONOCYTES NFR BLD: 11.9 % (ref 0–10)
NEUTROPHILS # BLD: 5 K/UL (ref 1.5–7.5)
NEUTS SEG NFR BLD: 70 % (ref 50–65)
NITRITE UR QL STRIP.AUTO: NEGATIVE
PH UR STRIP.AUTO: 8 [PH] (ref 5–8)
PLATELET # BLD AUTO: 258 K/UL (ref 130–400)
PMV BLD AUTO: 9.1 FL (ref 9.4–12.4)
POTASSIUM SERPL-SCNC: 4.1 MMOL/L (ref 3.5–5.1)
PROT SERPL-MCNC: 7.6 G/DL (ref 6.4–8.3)
PROT UR STRIP.AUTO-MCNC: 100 MG/DL
PROTHROMBIN TIME: 14.8 SEC (ref 12–14.6)
RBC # BLD AUTO: 5.32 M/UL (ref 4.7–6.1)
RBC #/AREA URNS AUTO: 5 /HPF (ref 0–4)
SODIUM SERPL-SCNC: 139 MMOL/L (ref 136–145)
SP GR UR STRIP.AUTO: 1.03 (ref 1–1.03)
TROPONIN, HIGH SENSITIVITY: <6 NG/L (ref 0–22)
UROBILINOGEN UR STRIP.AUTO-MCNC: 1 E.U./DL
WBC # BLD AUTO: 7.1 K/UL (ref 4.8–10.8)
WBC #/AREA URNS AUTO: 2 /HPF (ref 0–5)

## 2025-08-26 PROCEDURE — 6360000004 HC RX CONTRAST MEDICATION: Performed by: EMERGENCY MEDICINE

## 2025-08-26 PROCEDURE — 71045 X-RAY EXAM CHEST 1 VIEW: CPT

## 2025-08-26 PROCEDURE — 85610 PROTHROMBIN TIME: CPT

## 2025-08-26 PROCEDURE — 93005 ELECTROCARDIOGRAM TRACING: CPT | Performed by: EMERGENCY MEDICINE

## 2025-08-26 PROCEDURE — 36415 COLL VENOUS BLD VENIPUNCTURE: CPT

## 2025-08-26 PROCEDURE — 85025 COMPLETE CBC W/AUTO DIFF WBC: CPT

## 2025-08-26 PROCEDURE — 84484 ASSAY OF TROPONIN QUANT: CPT

## 2025-08-26 PROCEDURE — 96374 THER/PROPH/DIAG INJ IV PUSH: CPT

## 2025-08-26 PROCEDURE — 99285 EMERGENCY DEPT VISIT HI MDM: CPT

## 2025-08-26 PROCEDURE — 74177 CT ABD & PELVIS W/CONTRAST: CPT

## 2025-08-26 PROCEDURE — 93010 ELECTROCARDIOGRAM REPORT: CPT | Performed by: INTERNAL MEDICINE

## 2025-08-26 PROCEDURE — 81001 URINALYSIS AUTO W/SCOPE: CPT

## 2025-08-26 PROCEDURE — 85379 FIBRIN DEGRADATION QUANT: CPT

## 2025-08-26 PROCEDURE — 96375 TX/PRO/DX INJ NEW DRUG ADDON: CPT

## 2025-08-26 PROCEDURE — 6360000002 HC RX W HCPCS: Performed by: EMERGENCY MEDICINE

## 2025-08-26 PROCEDURE — 83690 ASSAY OF LIPASE: CPT

## 2025-08-26 PROCEDURE — 80053 COMPREHEN METABOLIC PANEL: CPT

## 2025-08-26 PROCEDURE — 71275 CT ANGIOGRAPHY CHEST: CPT

## 2025-08-26 RX ORDER — IOPAMIDOL 755 MG/ML
70 INJECTION, SOLUTION INTRAVASCULAR
Status: COMPLETED | OUTPATIENT
Start: 2025-08-26 | End: 2025-08-26

## 2025-08-26 RX ORDER — ORPHENADRINE CITRATE 30 MG/ML
60 INJECTION INTRAMUSCULAR; INTRAVENOUS ONCE
Status: COMPLETED | OUTPATIENT
Start: 2025-08-26 | End: 2025-08-26

## 2025-08-26 RX ORDER — ONDANSETRON 2 MG/ML
4 INJECTION INTRAMUSCULAR; INTRAVENOUS ONCE
Status: COMPLETED | OUTPATIENT
Start: 2025-08-26 | End: 2025-08-26

## 2025-08-26 RX ORDER — MORPHINE SULFATE 4 MG/ML
4 INJECTION, SOLUTION INTRAMUSCULAR; INTRAVENOUS ONCE
Refills: 0 | Status: COMPLETED | OUTPATIENT
Start: 2025-08-26 | End: 2025-08-26

## 2025-08-26 RX ADMIN — ORPHENADRINE CITRATE 60 MG: 30 INJECTION, SOLUTION INTRAMUSCULAR; INTRAVENOUS at 14:36

## 2025-08-26 RX ADMIN — ONDANSETRON 4 MG: 2 INJECTION, SOLUTION INTRAMUSCULAR; INTRAVENOUS at 11:15

## 2025-08-26 RX ADMIN — MORPHINE SULFATE 4 MG: 4 INJECTION, SOLUTION INTRAMUSCULAR; INTRAVENOUS at 11:16

## 2025-08-26 RX ADMIN — IOPAMIDOL 70 ML: 755 INJECTION, SOLUTION INTRAVENOUS at 12:47

## 2025-08-26 ASSESSMENT — ENCOUNTER SYMPTOMS
RHINORRHEA: 0
SHORTNESS OF BREATH: 0
VOMITING: 0
COUGH: 0
SORE THROAT: 0
DIARRHEA: 0
ABDOMINAL PAIN: 0
NAUSEA: 0
BACK PAIN: 1

## 2025-08-26 ASSESSMENT — PAIN SCALES - GENERAL
PAINLEVEL_OUTOF10: 7

## 2025-08-26 ASSESSMENT — PAIN - FUNCTIONAL ASSESSMENT
PAIN_FUNCTIONAL_ASSESSMENT: 0-10

## 2025-08-26 ASSESSMENT — PAIN DESCRIPTION - DESCRIPTORS: DESCRIPTORS: SHARP

## 2025-08-26 ASSESSMENT — PAIN DESCRIPTION - PAIN TYPE: TYPE: ACUTE PAIN

## 2025-08-26 ASSESSMENT — PAIN DESCRIPTION - LOCATION
LOCATION: CHEST;FLANK;BACK
LOCATION: CHEST

## 2025-08-26 ASSESSMENT — PAIN DESCRIPTION - ORIENTATION
ORIENTATION: LEFT
ORIENTATION: LEFT

## (undated) DEVICE — NDL CNTR 40CT FM MAG: Brand: MEDLINE INDUSTRIES, INC.

## (undated) DEVICE — Device

## (undated) DEVICE — TOWEL,OR,DSP,ST,BLUE,DLX,10/PK,8PK/CS: Brand: MEDLINE

## (undated) DEVICE — HI-TORQUE COMMAND ES GUIDE WIRE .014" 300 CM: Brand: HI-TORQUE COMMAND

## (undated) DEVICE — CVR PROB GEN PURP W ISOSILK 6X48

## (undated) DEVICE — TB SXN SURG DLP MALL/CARD SFT/TP 6F 3IN

## (undated) DEVICE — DRSNG SURESITE WNDW 4X4.5

## (undated) DEVICE — INTENDED FOR TISSUE SEPARATION, AND OTHER PROCEDURES THAT REQUIRE A SHARP SURGICAL BLADE TO PUNCTURE OR CUT.: Brand: BARD-PARKER ® STAINLESS STEEL BLADES

## (undated) DEVICE — RADIFOCUS GLIDECATH: Brand: GLIDECATH

## (undated) DEVICE — GAUZE,SPONGE,4"X4",16PLY,XRAY,STRL,LF: Brand: MEDLINE

## (undated) DEVICE — TUBING, SUCTION, 1/4" X 12', STRAIGHT: Brand: MEDLINE

## (undated) DEVICE — LP VESL MINI BLU PK/2

## (undated) DEVICE — RADIFOCUS GLIDEWIRE ADVANTAGE GUIDEWIRE: Brand: GLIDEWIRE ADVANTAGE

## (undated) DEVICE — CANN VESL ACRN TP 4MM

## (undated) DEVICE — PINNACLE R/O II INTRODUCER SHEATH WITH RADIOPAQUE MARKER: Brand: PINNACLE

## (undated) DEVICE — ATLAS® GOLD PTA DILATATION CATHETER 12 MM X 40 MM, 80 CM CATHETER: Brand: ATLAS® GOLD

## (undated) DEVICE — DESTINATION PERIPHERAL GUIDING SHEATH: Brand: DESTINATION

## (undated) DEVICE — PROXIMATE RH ROTATING HEAD SKIN STAPLERS (35 WIDE) CONTAINS 35 STAINLESS STEEL STAPLES: Brand: PROXIMATE

## (undated) DEVICE — NAVICROSS SUPPORT CATHETER: Brand: NAVICROSS

## (undated) DEVICE — BALN NANOCROSS OTW .014 4F 3X210 150CM

## (undated) DEVICE — PK TURNOVER RM ADV

## (undated) DEVICE — ST MIC/INTRO ACC SHRP/NDL TUNG/TP NITNL 5F 45CM 7CM

## (undated) DEVICE — RETRACTION TAPE, MAXI, RED, ARTERIES: Brand: AXIOM® LIG-A-LOOPS®

## (undated) DEVICE — INFLATION DEVICE: Brand: ENCORE™ 26

## (undated) DEVICE — GLV SURG DERMASSURE GRN LF PF 8.0

## (undated) DEVICE — DEV CLS VASC MYNXCONTROL 6FTO7F

## (undated) DEVICE — PENCL E/S HNDSWCH ROCKR CB

## (undated) DEVICE — IRRIGATOR BULB ASEPTO 60CC STRL

## (undated) DEVICE — GLV SURG SENSICARE W/ALOE PF LF 7.5 STRL

## (undated) DEVICE — FOGARTY THRU-LUMEN EMBOLECTOMY CATHETER 3F 120CM: Brand: FOGARTY

## (undated) DEVICE — SCANLAN® SURG-I-PAW® INSTRUMENT COVERS - RED, 1/10" X 5"/ 3 MMX13 CM (2 - 5" PCS /PKG): Brand: SCANLAN® SURG-I-PAW® INSTRUMENT COVERS

## (undated) DEVICE — GW STARTER JB STR .035 15X150CM

## (undated) DEVICE — PAD ENDOVASCULAR: Brand: MEDLINE INDUSTRIES, INC.

## (undated) DEVICE — BG BANDED WRUBBER BAND AND TP 36X54IN